# Patient Record
Sex: FEMALE | Race: WHITE | NOT HISPANIC OR LATINO | Employment: OTHER | URBAN - METROPOLITAN AREA
[De-identification: names, ages, dates, MRNs, and addresses within clinical notes are randomized per-mention and may not be internally consistent; named-entity substitution may affect disease eponyms.]

---

## 2017-02-24 ENCOUNTER — OFFICE VISIT (OUTPATIENT)
Dept: CARDIOLOGY | Facility: CLINIC | Age: 72
End: 2017-02-24

## 2017-02-24 VITALS
HEIGHT: 66 IN | BODY MASS INDEX: 19.98 KG/M2 | DIASTOLIC BLOOD PRESSURE: 80 MMHG | SYSTOLIC BLOOD PRESSURE: 150 MMHG | HEART RATE: 73 BPM | WEIGHT: 124.3 LBS

## 2017-02-24 DIAGNOSIS — I73.9 ANGIOPATHY, PERIPHERAL (HCC): ICD-10-CM

## 2017-02-24 DIAGNOSIS — I10 ESSENTIAL HYPERTENSION: ICD-10-CM

## 2017-02-24 DIAGNOSIS — I25.5 CARDIOMYOPATHY, ISCHEMIC: ICD-10-CM

## 2017-02-24 DIAGNOSIS — I25.10 ATHEROSCLEROSIS OF NATIVE CORONARY ARTERY OF NATIVE HEART WITHOUT ANGINA PECTORIS: Primary | ICD-10-CM

## 2017-02-24 DIAGNOSIS — I65.29 ASYMPTOMATIC CAROTID ARTERY NARROWING WITHOUT INFARCTION, UNSPECIFIED LATERALITY: ICD-10-CM

## 2017-02-24 PROCEDURE — 99214 OFFICE O/P EST MOD 30 MIN: CPT | Performed by: INTERNAL MEDICINE

## 2017-02-24 PROCEDURE — 93000 ELECTROCARDIOGRAM COMPLETE: CPT | Performed by: INTERNAL MEDICINE

## 2017-02-24 RX ORDER — BENAZEPRIL HYDROCHLORIDE 5 MG/1
40 TABLET, FILM COATED ORAL DAILY
Qty: 90 TABLET | Refills: 3 | Status: SHIPPED | OUTPATIENT
Start: 2017-02-24 | End: 2017-02-24

## 2017-02-24 RX ORDER — LOVASTATIN 20 MG/1
20 TABLET ORAL
Qty: 90 TABLET | Refills: 3 | Status: SHIPPED | OUTPATIENT
Start: 2017-02-24 | End: 2018-02-23 | Stop reason: ALTCHOICE

## 2017-02-24 RX ORDER — BENAZEPRIL HYDROCHLORIDE 40 MG/1
40 TABLET, FILM COATED ORAL DAILY
Qty: 90 TABLET | Refills: 2 | Status: SHIPPED | OUTPATIENT
Start: 2017-02-24 | End: 2018-02-23 | Stop reason: ALTCHOICE

## 2017-02-24 RX ORDER — CARVEDILOL 3.12 MG/1
3.12 TABLET ORAL 2 TIMES DAILY WITH MEALS
Qty: 180 TABLET | Refills: 3 | Status: SHIPPED | OUTPATIENT
Start: 2017-02-24 | End: 2018-02-26 | Stop reason: SDUPTHER

## 2017-02-24 NOTE — PROGRESS NOTES
Date of Office Visit: 2017  Encounter Provider: Olamide Whaley MD  Place of Service: [facility] CARDIOLOGY  Patient Name: Marlin Pedroza  :1945      Patient ID:  Marlin Pedroza is a 71 y.o. female is here for  followup for CAD.         History of Present Illness    She is known to have significant claudication of the right lower extremity and follows  with Dr. Pinto. She has severe disease involving the right external iliac, right  common iliac, occlusion of the right superficial femoral artery with single vessel runoff  to the right foot posterior tibial artery. She, on the left side, has severe disease, but  patent left superficial femoral artery on the left. She is also known to have carotid  artery disease and had a carotid artery duplex done 2013, showing 15-49% left  internal carotid artery stenosis and 50-60% right internal carotid artery stenosis.  Because of the amount of claudication she has in the right foot she is in need of some  sort of intervention for that leg, so Dr. Pinto is planning for 2013 for  stenting, possibly open procedure, to the right lower extremity.      When I saw her in the office, she had an abnormal electrocardiogram showing inferior  Q-waves, as well as T-wave inversion in the inferior leads. I was very concerned about  coronary artery disease, and she complained of some chest pain and some short-windedness.  Because of her symptoms that very day, on 10/03/2013, we set the patient up for an  echocardiogram which showed an ejection fraction of 42% and mild left ventricular   dilation, inferior wall and septal wall moderate hypokinesis. There was moderate mitral  insufficiency, mild tricuspid insufficiency and grade 1 diastolic dysfunction. She also  had a stress PET study done 10/10/03/2013, which showed a small area of infarct, in the  inferior wall there was a large amount of maryam-infarct ischemia. We took her to  the  cardiac catheterization lab, which was done then, 10/09/2013. This showed no heart  failure. Her ejection fraction was 50% with inferobasilar akinesis and mild inferior wall  hypokinesis. The left anterior descending had sequential proximal 40% stenosis and 50-60%  mid-vessel stenosis. The first diagonal branch was long and patent. The left circumflex  vessel was widely patent and the obtuse marginal branch, which was a moderate vessel, had  20% proximal stenosis. The right coronary artery was 100% occluded proximally and filled  briskly SOFIE-1 flow by ipsilateral and contralateral collaterals. At that time, I  recommended medical changes so that she could go ahead and get intervention of her leg  done.      She has pretty bad Raynaud's disease, and it has worsened since  being on Coreg. In addition, she says the atorvastatin causes her to cough and keeps her  from sleeping.      On 11/25/2013, she did have intervention to her right leg. She had right common and deep  femoral endarterectomy with Vascu-Guard patch. She also had a right common and an  external iliac stent placed.      Marlin Pedroza is here today for followup.  She does continue to smoke; anywhere from a half-pack of cigarettes to one pack of cigarettes per day.  She says she is having difficulty affording her medications and wants to switch to all the $4.00 medications at [facility].  She is not taking anything for her cholesterol and she really needs to be but she says the only she can afford is what is on the $4.00 list so I am going to start her on lovastatin 20 mg daily and switch her losartan to benazepril 20 mg daily.   She has not had any exertional chest tightness or pressure.  She does not feel her heart racing or skipping.  She has no lower extremity edema.  She has no wheezing or coughing.  Her feet are warm and she says she is not having a lot of pain in her legs.  She is not actively exercising.  Overall, she feels like she is doing pretty  well.          Past Medical History   Diagnosis Date   • CAD (coronary artery disease)    • Carotid artery stenosis    • Hypertension    • Intermittent claudication    • Ischemic cardiomyopathy    • PAD (peripheral artery disease)    • Stenosis of iliac artery          Past Surgical History   Procedure Laterality Date   • Hernia repair     • Coronary angioplasty  10/2013   • Hysterectomy     • Cardiac catheterization         Current Outpatient Prescriptions on File Prior to Visit   Medication Sig Dispense Refill   • aspirin 81 MG tablet Take by mouth daily.     • carvedilol (COREG) 3.125 MG tablet Take 1 tablet (3.125 mg total) by mouth 2 (two) times a day with meals. 180 tablet 3   • Cholecalciferol (VITAMIN D3) 2000 UNITS tablet Take by mouth.     • esomeprazole (NexIUM) 40 MG capsule Take by mouth daily.     • losartan (COZAAR) 50 MG tablet Take 1 tablet (50 mg total) by mouth daily. 90 tablet 3   • vitamin B-12 (CYANOCOBALAMIN) 2500 MCG sublingual tablet tablet Place under the tongue daily.     • vitamin B-6 (PYRIDOXINE) 100 MG tablet Take 100 mg by mouth daily.     • [DISCONTINUED] atorvastatin (LIPITOR) 40 MG tablet Take by mouth.       No current facility-administered medications on file prior to visit.        Social History     Social History   • Marital status:      Spouse name: N/A   • Number of children: N/A   • Years of education: N/A     Occupational History   • Not on file.     Social History Main Topics   • Smoking status: Current Every Day Smoker     Packs/day: 0.50   • Smokeless tobacco: Not on file   • Alcohol use No   • Drug use: Not on file      Comment: CAFFEINE USE   • Sexual activity: Not on file     Other Topics Concern   • Not on file     Social History Narrative           Review of Systems   Constitution: Negative.   HENT: Negative for congestion and headaches.    Eyes: Negative for vision loss in left eye and vision loss in right eye.   Respiratory: Negative.  Negative for cough,  "hemoptysis, shortness of breath, sleep disturbances due to breathing, snoring, sputum production and wheezing.    Endocrine: Negative.    Hematologic/Lymphatic: Negative.    Skin: Negative for poor wound healing and rash.   Musculoskeletal: Negative for falls, gout, muscle cramps and myalgias.   Gastrointestinal: Negative for abdominal pain, diarrhea, dysphagia, hematemesis, melena, nausea and vomiting.   Neurological: Negative for excessive daytime sleepiness, dizziness, light-headedness, loss of balance, seizures and vertigo.   Psychiatric/Behavioral: Negative for depression and substance abuse. The patient is not nervous/anxious.        Procedures    ECG 12 Lead  Date/Time: 2/24/2017 11:01 AM  Performed by: VLADISLAV ARROYO  Authorized by: VLADISLAV ARROYO   Comparison: compared with previous ECG   Similar to previous ECG  Rhythm: sinus rhythm  T depression: II, III and aVF  Clinical impression: abnormal ECG               Objective:      Vitals:    02/24/17 1036   BP: 150/80   BP Location: Right arm   Patient Position: Sitting   Pulse: 73   Weight: 124 lb 4.8 oz (56.4 kg)   Height: 66\" (167.6 cm)     Body mass index is 20.06 kg/(m^2).    Physical Exam   Constitutional: She is oriented to person, place, and time. She appears well-developed and well-nourished. No distress.   HENT:   Head: Normocephalic and atraumatic.   Eyes: Conjunctivae are normal. No scleral icterus.   Neck: Neck supple. No JVD present. Carotid bruit is not present. No thyromegaly present.   Cardiovascular: Normal rate, regular rhythm, S1 normal, S2 normal, normal heart sounds and intact distal pulses.   No extrasystoles are present. PMI is not displaced.  Exam reveals no gallop.    No murmur heard.  Pulses:       Carotid pulses are 2+ on the right side, and 2+ on the left side.       Radial pulses are 2+ on the right side, and 2+ on the left side.        Dorsalis pedis pulses are 2+ on the right side, and 2+ on the left side.        " Posterior tibial pulses are 2+ on the right side, and 2+ on the left side.   Pulmonary/Chest: Effort normal and breath sounds normal. No respiratory distress. She has no wheezes. She has no rhonchi. She has no rales. She exhibits no tenderness.   Abdominal: Soft. Bowel sounds are normal. She exhibits no distension, no abdominal bruit and no mass. There is no tenderness.   Musculoskeletal: She exhibits no edema or deformity.   Lymphadenopathy:     She has no cervical adenopathy.   Neurological: She is alert and oriented to person, place, and time. No cranial nerve deficit.   Skin: Skin is warm and dry. No rash noted. She is not diaphoretic. No cyanosis. No pallor. Nails show no clubbing.   Psychiatric: She has a normal mood and affect. Judgment normal.   Vitals reviewed.      Lab Review:       Assessment:      Diagnosis Plan   1. Atherosclerosis of native coronary artery of native heart without angina pectoris     2. Asymptomatic carotid artery narrowing without infarction, unspecified laterality     3. Cardiomyopathy, ischemic     4. Angiopathy, peripheral     5. Essential hypertension       1. PAD, sees Dr. Pinto, s/p right femoral endartectomy and stents in the right iliac.   2. CAD. Occluded RCA with collaterals, no CHF or angina.   3. Tobacco use. Needs to stop smoking, and she knows this and we discuss this at every visit.    4. HTN, on coreg and benazepril.   5. Moderate right internal carotid artery stenosis with mild left internal carotid artery  stenosis.   6. HLD, start lovastatin 20mg daily, currently is not on anything  7. Insomnia, may try melatonin.      Plan:       See back in 1 year, med changes due to affordability.     Coronary Artery Disease  Assessment  • The patient has no angina    Subjective - Objective  • Current antiplatelet therapy includes aspirin 81 mg

## 2018-02-23 ENCOUNTER — OFFICE VISIT (OUTPATIENT)
Dept: CARDIOLOGY | Facility: CLINIC | Age: 73
End: 2018-02-23

## 2018-02-23 VITALS
HEART RATE: 71 BPM | WEIGHT: 136.1 LBS | HEIGHT: 66 IN | DIASTOLIC BLOOD PRESSURE: 82 MMHG | BODY MASS INDEX: 21.87 KG/M2 | SYSTOLIC BLOOD PRESSURE: 160 MMHG

## 2018-02-23 DIAGNOSIS — I25.5 CARDIOMYOPATHY, ISCHEMIC: Primary | ICD-10-CM

## 2018-02-23 DIAGNOSIS — I10 ESSENTIAL HYPERTENSION: ICD-10-CM

## 2018-02-23 DIAGNOSIS — I73.9 ANGIOPATHY, PERIPHERAL (HCC): ICD-10-CM

## 2018-02-23 DIAGNOSIS — I25.10 ATHEROSCLEROSIS OF NATIVE CORONARY ARTERY OF NATIVE HEART WITHOUT ANGINA PECTORIS: ICD-10-CM

## 2018-02-23 PROCEDURE — 99214 OFFICE O/P EST MOD 30 MIN: CPT | Performed by: INTERNAL MEDICINE

## 2018-02-23 PROCEDURE — 93000 ELECTROCARDIOGRAM COMPLETE: CPT | Performed by: INTERNAL MEDICINE

## 2018-02-23 RX ORDER — CARVEDILOL 3.12 MG/1
TABLET ORAL
Qty: 180 TABLET | Refills: 3 | Status: CANCELLED | OUTPATIENT
Start: 2018-02-23

## 2018-02-23 RX ORDER — LOSARTAN POTASSIUM 50 MG/1
50 TABLET ORAL DAILY
Qty: 90 TABLET | Refills: 3 | Status: SHIPPED | OUTPATIENT
Start: 2018-02-23 | End: 2020-02-25 | Stop reason: ALTCHOICE

## 2018-02-23 NOTE — PROGRESS NOTES
Date of Office Visit: 2018  Encounter Provider: Olamide Whaley MD  Place of Service: [facility] CARDIOLOGY  Patient Name: Marlin Pedroza  :1945      Patient ID:  Marlin Pedroza is a 72 y.o. female is here for  followup for CAD        History of Present Illness  She is known to have significant claudication of the right lower extremity and follows  with Dr. Pinto. She has severe disease involving the right external iliac, right  common iliac, occlusion of the right superficial femoral artery with single vessel runoff  to the right foot posterior tibial artery. She, on the left side, has severe disease, but  patent left superficial femoral artery on the left. She is also known to have carotid  artery disease and had a carotid artery duplex done 2013, showing 15-49% left  internal carotid artery stenosis and 50-60% right internal carotid artery stenosis.  Because of the amount of claudication she has in the right foot she is in need of some  sort of intervention for that leg, so Dr. Pinto is planning for 2013 for  stenting, possibly open procedure, to the right lower extremity.       When I saw her in the office, she had an abnormal electrocardiogram showing inferior  Q-waves, as well as T-wave inversion in the inferior leads. I was very concerned about  coronary artery disease, and she complained of some chest pain and some short-windedness.  Because of her symptoms that very day, on 10/03/2013, we set the patient up for an  echocardiogram which showed an ejection fraction of 42% and mild left ventricular   dilation, inferior wall and septal wall moderate hypokinesis. There was moderate mitral  insufficiency, mild tricuspid insufficiency and grade 1 diastolic dysfunction. She also  had a stress PET study done 10/10/03/2013, which showed a small area of infarct, in the  inferior wall there was a large amount of maryam-infarct ischemia. We took her to  the  cardiac catheterization lab, which was done then, 10/09/2013. This showed no heart  failure. Her ejection fraction was 50% with inferobasilar akinesis and mild inferior wall  hypokinesis. The left anterior descending had sequential proximal 40% stenosis and 50-60%  mid-vessel stenosis. The first diagonal branch was long and patent. The left circumflex  vessel was widely patent and the obtuse marginal branch, which was a moderate vessel, had  20% proximal stenosis. The right coronary artery was 100% occluded proximally and filled  briskly SOFIE-1 flow by ipsilateral and contralateral collaterals. At that time, I  recommended medical changes so that she could go ahead and get intervention of her leg  done.       She has pretty bad Raynaud's disease, and it has worsened since  being on Coreg. In addition, she says the atorvastatin causes her to cough and keeps her  from sleeping.       On 11/25/2013, she did have intervention to her right leg. She had right common and deep  femoral endarterectomy with Vascu-Guard patch. She also had a right common and an  external iliac stent placed.       Marlin Pedroza is here today for followup.  She does continue to smoke; anywhere from a half-pack of cigarettes to one pack of cigarettes per day.   she's developed a cough with benazepril so we'll try losartan. I'm not sure if she will be able to afford it.   she has occasional claudication.  She has no chest pain or pressure.  She's had no heart racing or skipping.  She is not short winded.  She will see Dr. Pinto in August.      Past Medical History:   Diagnosis Date   • CAD (coronary artery disease)    • Carotid artery stenosis    • Hypertension    • Intermittent claudication    • Ischemic cardiomyopathy    • PAD (peripheral artery disease)    • Stenosis of iliac artery          Past Surgical History:   Procedure Laterality Date   • CARDIAC CATHETERIZATION     • CORONARY ANGIOPLASTY  10/2013   • HERNIA REPAIR     • HYSTERECTOMY          Current Outpatient Prescriptions on File Prior to Visit   Medication Sig Dispense Refill   • aspirin 81 MG tablet Take by mouth daily.     • carvedilol (COREG) 3.125 MG tablet Take 1 tablet by mouth 2 (Two) Times a Day With Meals. 180 tablet 3   • Cholecalciferol (VITAMIN D3) 2000 UNITS tablet Take by mouth.     • esomeprazole (NexIUM) 40 MG capsule Take by mouth daily.     • vitamin B-12 (CYANOCOBALAMIN) 2500 MCG sublingual tablet tablet Place under the tongue daily.     • vitamin B-6 (PYRIDOXINE) 100 MG tablet Take 100 mg by mouth daily.     • [DISCONTINUED] benazepril (LOTENSIN) 40 MG tablet Take 1 tablet by mouth Daily. 90 tablet 2   • [DISCONTINUED] lovastatin (MEVACOR) 20 MG tablet Take 1 tablet by mouth Daily With Dinner. 90 tablet 3     No current facility-administered medications on file prior to visit.        Social History     Social History   • Marital status:      Spouse name: N/A   • Number of children: N/A   • Years of education: N/A     Occupational History   • Not on file.     Social History Main Topics   • Smoking status: Current Every Day Smoker     Packs/day: 0.50   • Smokeless tobacco: Never Used   • Alcohol use No   • Drug use: Not on file      Comment: CAFFEINE USE   • Sexual activity: Not on file     Other Topics Concern   • Not on file     Social History Narrative           Review of Systems   Constitution: Negative.   HENT: Negative for congestion.    Eyes: Negative for vision loss in left eye and vision loss in right eye.   Respiratory: Negative.  Negative for cough, hemoptysis, shortness of breath, sleep disturbances due to breathing, snoring, sputum production and wheezing.    Endocrine: Negative.    Hematologic/Lymphatic: Negative.    Skin: Negative for poor wound healing and rash.   Musculoskeletal: Negative for falls, gout, muscle cramps and myalgias.   Gastrointestinal: Negative for abdominal pain, diarrhea, dysphagia, hematemesis, melena, nausea and vomiting.  "  Neurological: Negative for excessive daytime sleepiness, dizziness, headaches, light-headedness, loss of balance, seizures and vertigo.   Psychiatric/Behavioral: Negative for depression and substance abuse. The patient is not nervous/anxious.        Procedures    ECG 12 Lead  Date/Time: 2/23/2018 9:52 AM  Performed by: VLADISLAV ARROYO  Authorized by: VLADISLAV ARROYO   Comparison: compared with previous ECG   Similar to previous ECG  Rhythm: sinus rhythm  Other findings: PRWP  Q waves: II, III and aVF  Clinical impression: abnormal ECG               Objective:      Vitals:    02/23/18 0945   BP: 160/82   BP Location: Right arm   Patient Position: Sitting   Pulse: 71   Weight: 61.7 kg (136 lb 1.6 oz)   Height: 167.6 cm (66\")     Body mass index is 21.97 kg/(m^2).    Physical Exam   Constitutional: She is oriented to person, place, and time. She appears well-developed and well-nourished. No distress.   HENT:   Head: Normocephalic and atraumatic.   Eyes: Conjunctivae are normal. No scleral icterus.   Neck: Neck supple. No JVD present. Carotid bruit is not present. No thyromegaly present.   Cardiovascular: Normal rate, regular rhythm, S1 normal, S2 normal, normal heart sounds and intact distal pulses.   No extrasystoles are present. PMI is not displaced.  Exam reveals no gallop.    No murmur heard.  Pulses:       Carotid pulses are 2+ on the right side, and 2+ on the left side.       Radial pulses are 2+ on the right side, and 2+ on the left side.        Dorsalis pedis pulses are 2+ on the right side, and 2+ on the left side.        Posterior tibial pulses are 2+ on the right side, and 2+ on the left side.   Pulmonary/Chest: Effort normal and breath sounds normal. No respiratory distress. She has no wheezes. She has no rhonchi. She has no rales. She exhibits no tenderness.   Abdominal: Soft. Bowel sounds are normal. She exhibits no distension, no abdominal bruit and no mass. There is no tenderness. "   Musculoskeletal: She exhibits no edema or deformity.   Lymphadenopathy:     She has no cervical adenopathy.   Neurological: She is alert and oriented to person, place, and time. No cranial nerve deficit.   Skin: Skin is warm and dry. No rash noted. She is not diaphoretic. No cyanosis. No pallor. Nails show no clubbing.   Psychiatric: She has a normal mood and affect. Judgment normal.   Vitals reviewed.      Lab Review:       Assessment:      Diagnosis Plan   1. Cardiomyopathy, ischemic     2. Essential hypertension     3. Atherosclerosis of native coronary artery of native heart without angina pectoris     4. Angiopathy, peripheral       1. PAD, sees Dr. Pinto, s/p right femoral endartectomy and stents in the right iliac.    2. CAD. Occluded RCA with collaterals, no CHF or angina.   3. Tobacco use. Needs to stop smoking, and she knows this and we discuss this at every visit.    4. HTN, on coreg, off benazepril due to cough, try losartan 50mg daily.   5. Moderate right internal carotid artery stenosis with mild left internal carotid artery  stenosis.   6. HLD, off lovastatin 20mg due to headaches and off atorvastatin due to cough,  currently is not on anything  7. Insomnia, may try melatonin.      Plan:       Will call in one month to let me know how she's feeling with the losartan.  If she is tolerating it, consider simvastatin 20 mg daily for her lipids.  I didn't want to start to new medications at once.  see back in 1 year.     Coronary Artery Disease  Assessment  • The patient has no angina    Subjective - Objective  • Current antiplatelet therapy includes aspirin 81 mg

## 2018-02-26 RX ORDER — CARVEDILOL 3.12 MG/1
3.12 TABLET ORAL 2 TIMES DAILY WITH MEALS
Qty: 180 TABLET | Refills: 3 | Status: SHIPPED | OUTPATIENT
Start: 2018-02-26 | End: 2019-04-30 | Stop reason: SDUPTHER

## 2018-08-22 ENCOUNTER — OFFICE (OUTPATIENT)
Dept: URBAN - NONMETROPOLITAN AREA CLINIC 13 | Facility: CLINIC | Age: 73
End: 2018-08-22
Payer: MEDICARE

## 2018-08-22 ENCOUNTER — OFFICE (OUTPATIENT)
Dept: URBAN - NONMETROPOLITAN AREA CLINIC 13 | Facility: CLINIC | Age: 73
End: 2018-08-22
Payer: COMMERCIAL

## 2018-08-22 VITALS
HEART RATE: 66 BPM | HEIGHT: 68 IN | DIASTOLIC BLOOD PRESSURE: 87 MMHG | OXYGEN SATURATION: 98 % | WEIGHT: 202 LBS | DIASTOLIC BLOOD PRESSURE: 76 MMHG | SYSTOLIC BLOOD PRESSURE: 152 MMHG | SYSTOLIC BLOOD PRESSURE: 134 MMHG

## 2018-08-22 VITALS — HEIGHT: 68 IN

## 2018-08-22 DIAGNOSIS — K21.9 GASTRO-ESOPHAGEAL REFLUX DISEASE WITHOUT ESOPHAGITIS: ICD-10-CM

## 2018-08-22 DIAGNOSIS — R13.10 DYSPHAGIA, UNSPECIFIED: ICD-10-CM

## 2018-08-22 DIAGNOSIS — Z01.812 ENCOUNTER FOR PREPROCEDURAL LABORATORY EXAMINATION: ICD-10-CM

## 2018-08-22 LAB
CBC SYSMEX: HEMATOCRIT: 50.9 % — CRITICAL HIGH (ref 37–47)
CBC SYSMEX: HEMOGLOBIN: 17.5 G/DL — HIGH (ref 12–14)
CBC SYSMEX: LYMPHS %: 19.3 % — LOW (ref 20.5–40.5)
CBC SYSMEX: LYMPHS ABSOLUTE: 2 10*3U/L (ref 1.3–2.9)
CBC SYSMEX: MCH: 32.8 PG — HIGH (ref 27–32)
CBC SYSMEX: MCHC: 34.4 G/DL (ref 28.5–35)
CBC SYSMEX: MCV: 95.3 FL (ref 84–100)
CBC SYSMEX: MONOS %: 10.4 % — HIGH (ref 2–10)
CBC SYSMEX: MONOS ABSOLUTE: 1.1 10*3U/L (ref 0.3–3.8)
CBC SYSMEX: MPV: 11 FL (ref 8.3–11.9)
CBC SYSMEX: NEUT. %: 70.3 % — HIGH (ref 43–67)
CBC SYSMEX: NEUT. ABSOLUTE: 7.2 10*3U/L — HIGH (ref 2.2–4.8)
CBC SYSMEX: PLATELETS: 224 10*3U/L (ref 130–440)
CBC SYSMEX: RBC: 5.3 10*6U/L (ref 4.2–5.4)
CBC SYSMEX: RDW: 12.7 % (ref 11.5–15.5)
CBC SYSMEX: WBC: 10.3 10*3U/L (ref 4.5–10.5)
COMPLETE METABOLIC: ALBUMIN: 4.6 G/DL (ref 3.5–5.2)
COMPLETE METABOLIC: ALK. PHOS: 82 U/L (ref 40–150)
COMPLETE METABOLIC: ALT: 29 U/L (ref 0–55)
COMPLETE METABOLIC: AST: 25 U/L (ref 5–34)
COMPLETE METABOLIC: BUN: 13 MG/DL (ref 7–26)
COMPLETE METABOLIC: CALCIUM: 9.9 MG/DL (ref 8.4–10.3)
COMPLETE METABOLIC: CHLORIDE: 106 MMOL/L (ref 98–107)
COMPLETE METABOLIC: CO2: 25 MEQ/L (ref 22–29)
COMPLETE METABOLIC: CREATININE: 0.91 MG/DL (ref 0.57–1.25)
COMPLETE METABOLIC: GFR - AFRICAN AMERICAN: 78.5 (ref 59–200)
COMPLETE METABOLIC: GFR - NON AFRICAN AMERICAN: 64.8 (ref 59–200)
COMPLETE METABOLIC: GLUCOSE: 185 MG/DL — HIGH (ref 70–99)
COMPLETE METABOLIC: POTASSIUM: 3.9 MMOL/L (ref 3.5–5.3)
COMPLETE METABOLIC: SODIUM: 141 MMOL/L (ref 136–145)
COMPLETE METABOLIC: TOTAL BILIRUBIN: 0.6 MG/DL (ref 0.2–1.2)
COMPLETE METABOLIC: TOTAL PROTEIN: 7.1 G/DL (ref 6.4–8.3)

## 2018-08-22 PROCEDURE — 80053 COMPREHEN METABOLIC PANEL: CPT | Performed by: NURSE PRACTITIONER

## 2018-08-22 PROCEDURE — 36415 COLL VENOUS BLD VENIPUNCTURE: CPT | Performed by: NURSE PRACTITIONER

## 2018-08-22 PROCEDURE — 99203 OFFICE O/P NEW LOW 30 MIN: CPT | Performed by: NURSE PRACTITIONER

## 2018-08-22 PROCEDURE — 85025 COMPLETE CBC W/AUTO DIFF WBC: CPT | Performed by: NURSE PRACTITIONER

## 2018-09-10 ENCOUNTER — OFFICE (OUTPATIENT)
Dept: URBAN - NONMETROPOLITAN AREA CLINIC 13 | Facility: CLINIC | Age: 73
End: 2018-09-10
Payer: COMMERCIAL

## 2018-09-10 ENCOUNTER — AMBULATORY SURGICAL CENTER (OUTPATIENT)
Dept: URBAN - NONMETROPOLITAN AREA SURGERY 2 | Facility: SURGERY | Age: 73
End: 2018-09-10
Payer: COMMERCIAL

## 2018-09-10 VITALS
DIASTOLIC BLOOD PRESSURE: 62 MMHG | SYSTOLIC BLOOD PRESSURE: 128 MMHG | OXYGEN SATURATION: 100 % | DIASTOLIC BLOOD PRESSURE: 54 MMHG | HEART RATE: 63 BPM | RESPIRATION RATE: 16 BRPM | SYSTOLIC BLOOD PRESSURE: 118 MMHG | HEART RATE: 62 BPM | DIASTOLIC BLOOD PRESSURE: 72 MMHG | RESPIRATION RATE: 20 BRPM | RESPIRATION RATE: 18 BRPM | SYSTOLIC BLOOD PRESSURE: 124 MMHG | OXYGEN SATURATION: 98 % | HEART RATE: 61 BPM | DIASTOLIC BLOOD PRESSURE: 82 MMHG | OXYGEN SATURATION: 97 % | HEART RATE: 58 BPM | WEIGHT: 202 LBS | SYSTOLIC BLOOD PRESSURE: 108 MMHG | OXYGEN SATURATION: 94 % | DIASTOLIC BLOOD PRESSURE: 69 MMHG | HEART RATE: 50 BPM | HEIGHT: 68 IN | HEART RATE: 60 BPM | DIASTOLIC BLOOD PRESSURE: 71 MMHG | SYSTOLIC BLOOD PRESSURE: 104 MMHG

## 2018-09-10 DIAGNOSIS — K21.9 GASTRO-ESOPHAGEAL REFLUX DISEASE WITHOUT ESOPHAGITIS: ICD-10-CM

## 2018-09-10 DIAGNOSIS — R13.10 DYSPHAGIA, UNSPECIFIED: ICD-10-CM

## 2018-09-10 DIAGNOSIS — K29.40 CHRONIC ATROPHIC GASTRITIS WITHOUT BLEEDING: ICD-10-CM

## 2018-09-10 DIAGNOSIS — K22.2 ESOPHAGEAL OBSTRUCTION: ICD-10-CM

## 2018-09-10 DIAGNOSIS — K31.89 OTHER DISEASES OF STOMACH AND DUODENUM: ICD-10-CM

## 2018-09-10 DIAGNOSIS — E83.119 HEMOCHROMATOSIS, UNSPECIFIED: ICD-10-CM

## 2018-09-10 DIAGNOSIS — K29.70 GASTRITIS, UNSPECIFIED, WITHOUT BLEEDING: ICD-10-CM

## 2018-09-10 PROCEDURE — 00731 ANES UPR GI NDSC PX NOS: CPT | Mod: QS,QZ

## 2018-09-10 PROCEDURE — 43239 EGD BIOPSY SINGLE/MULTIPLE: CPT | Mod: 59 | Performed by: INTERNAL MEDICINE

## 2018-09-10 PROCEDURE — 88305 TISSUE EXAM BY PATHOLOGIST: CPT | Mod: TC | Performed by: INTERNAL MEDICINE

## 2018-09-10 PROCEDURE — 43248 EGD GUIDE WIRE INSERTION: CPT | Performed by: INTERNAL MEDICINE

## 2018-09-10 RX ORDER — PANTOPRAZOLE SODIUM 40 MG/1
TABLET, DELAYED RELEASE ORAL
Qty: 90 | Refills: 1 | Status: ACTIVE
Start: 2018-09-10

## 2018-09-13 LAB — SURGICAL PROCEDURE: PDF REPORT: (no result)

## 2019-02-25 ENCOUNTER — OFFICE VISIT (OUTPATIENT)
Dept: CARDIOLOGY | Facility: CLINIC | Age: 74
End: 2019-02-25

## 2019-02-25 VITALS
HEART RATE: 59 BPM | HEIGHT: 66 IN | WEIGHT: 137.5 LBS | SYSTOLIC BLOOD PRESSURE: 140 MMHG | BODY MASS INDEX: 22.1 KG/M2 | DIASTOLIC BLOOD PRESSURE: 90 MMHG

## 2019-02-25 DIAGNOSIS — I25.5 CARDIOMYOPATHY, ISCHEMIC: ICD-10-CM

## 2019-02-25 DIAGNOSIS — I25.10 ATHEROSCLEROSIS OF NATIVE CORONARY ARTERY OF NATIVE HEART WITHOUT ANGINA PECTORIS: Primary | ICD-10-CM

## 2019-02-25 DIAGNOSIS — I10 ESSENTIAL HYPERTENSION: ICD-10-CM

## 2019-02-25 PROCEDURE — 93000 ELECTROCARDIOGRAM COMPLETE: CPT | Performed by: INTERNAL MEDICINE

## 2019-02-25 PROCEDURE — 99214 OFFICE O/P EST MOD 30 MIN: CPT | Performed by: INTERNAL MEDICINE

## 2019-02-25 NOTE — PROGRESS NOTES
Date of Office Visit: 2019  Encounter Provider: Olamide Whaley MD  Place of Service: [facility] CARDIOLOGY  Patient Name: Marlin Pedroza  :1945      Patient ID:  Marlin Pedroza is a 73 y.o. female is here for  followup for CAD, ischemic CMP.         History of Present Illness    She is known to have significant claudication of the right lower extremity and follows  with Dr. Pinto. She has severe disease involving the right external iliac, right  common iliac, occlusion of the right superficial femoral artery with single vessel runoff  to the right foot posterior tibial artery. She, on the left side, has severe disease, but  patent left superficial femoral artery on the left. She is also known to have carotid  artery disease and had a carotid artery duplex done 2013, showing 15-49% left  internal carotid artery stenosis and 50-60% right internal carotid artery stenosis.  Because of the amount of claudication she has in the right foot she is in need of some  sort of intervention for that leg, so Dr. Pinto is planning for 2013 for  stenting, possibly open procedure, to the right lower extremity.       When I saw her in the office, she had an abnormal electrocardiogram showing inferior  Q-waves, as well as T-wave inversion in the inferior leads. I was very concerned about  coronary artery disease, and she complained of some chest pain and some short-windedness.  Because of her symptoms that very day, on 10/03/2013, we set the patient up for an  echocardiogram which showed an ejection fraction of 42% and mild left ventricular   dilation, inferior wall and septal wall moderate hypokinesis. There was moderate mitral  insufficiency, mild tricuspid insufficiency and grade 1 diastolic dysfunction. She also  had a stress PET study done 10/10/03/2013, which showed a small area of infarct, in the  inferior wall there was a large amount of maryam-infarct ischemia. We took  her to the  cardiac catheterization lab, which was done then, 10/09/2013. This showed no heart  failure. Her ejection fraction was 50% with inferobasilar akinesis and mild inferior wall  hypokinesis. The left anterior descending had sequential proximal 40% stenosis and 50-60%  mid-vessel stenosis. The first diagonal branch was long and patent. The left circumflex  vessel was widely patent and the obtuse marginal branch, which was a moderate vessel, had  20% proximal stenosis. The right coronary artery was 100% occluded proximally and filled  briskly SOFIE-1 flow by ipsilateral and contralateral collaterals. At that time, I  recommended medical changes so that she could go ahead and get intervention of her leg  done.       She has pretty bad Raynaud's disease, and it has worsened since  being on Coreg. In addition, she says the atorvastatin causes her to cough and keeps her  from sleeping.       On 11/25/2013, she did have intervention to her right leg. She had right common and deep  femoral endarterectomy with Vascu-Guard patch. She also had a right common and an  external iliac stent placed.     She has no chest pain, dyspnea, dizziness.  She continues to smoke.  She was in a financial scam with someone who claimed to be from social security and lost $27,000.  Because of this, she now has no money is having difficulty even affording routine generic medications.  She is not on losartan because of cost.  She is now on a statin because of muscle aching and cost.  Her only blood pressure medication currently is carvedilol.  Despite this, she overall feels fairly well.    She saw vascular 8/2019 and had carotid duplex showing 60-70% YONATHAN stenosis and 50-60% LICA stenosis.  Vertebral arteries had antegrade flow.  Her LAYNE on the right was 0.69 and on the left 0.95.    Past Medical History:   Diagnosis Date   • CAD (coronary artery disease)    • Carotid artery stenosis    • Hypertension    • Intermittent claudication  (CMS/[facility])    • Ischemic cardiomyopathy    • PAD (peripheral artery disease) (CMS/[facility])    • Stenosis of iliac artery (CMS/[facility])          Past Surgical History:   Procedure Laterality Date   • CARDIAC CATHETERIZATION     • CORONARY ANGIOPLASTY  10/2013   • HERNIA REPAIR     • HYSTERECTOMY         Current Outpatient Medications on File Prior to Visit   Medication Sig Dispense Refill   • aspirin 81 MG tablet Take by mouth daily.     • carvedilol (COREG) 3.125 MG tablet Take 1 tablet by mouth 2 (Two) Times a Day With Meals. (Patient taking differently: Take 6.25 mg by mouth 2 (Two) Times a Day With Meals.) 180 tablet 3   • Cholecalciferol (VITAMIN D3) 2000 UNITS tablet Take by mouth.     • esomeprazole (NexIUM) 40 MG capsule Take by mouth daily.     • vitamin B-12 (CYANOCOBALAMIN) 2500 MCG sublingual tablet tablet Place under the tongue daily.     • vitamin B-6 (PYRIDOXINE) 100 MG tablet Take 100 mg by mouth daily.     • losartan (COZAAR) 50 MG tablet Take 1 tablet by mouth Daily. 90 tablet 3     No current facility-administered medications on file prior to visit.        Social History     Socioeconomic History   • Marital status:      Spouse name: Not on file   • Number of children: Not on file   • Years of education: Not on file   • Highest education level: Not on file   Social Needs   • Financial resource strain: Not on file   • Food insecurity - worry: Not on file   • Food insecurity - inability: Not on file   • Transportation needs - medical: Not on file   • Transportation needs - non-medical: Not on file   Occupational History   • Not on file   Tobacco Use   • Smoking status: Current Every Day Smoker     Packs/day: 0.50   • Smokeless tobacco: Never Used   Substance and Sexual Activity   • Alcohol use: No   • Drug use: Not on file     Comment: CAFFEINE USE   • Sexual activity: Not on file   Other Topics Concern   • Not on file   Social History Narrative   • Not on file           Review of Systems  "  Constitution: Negative.   HENT: Negative for congestion.    Eyes: Negative for vision loss in left eye and vision loss in right eye.   Respiratory: Negative.  Negative for cough, hemoptysis, shortness of breath, sleep disturbances due to breathing, snoring, sputum production and wheezing.    Endocrine: Negative.    Hematologic/Lymphatic: Negative.    Skin: Negative for poor wound healing and rash.   Musculoskeletal: Negative for falls, gout, muscle cramps and myalgias.   Gastrointestinal: Negative for abdominal pain, diarrhea, dysphagia, hematemesis, melena, nausea and vomiting.   Neurological: Negative for excessive daytime sleepiness, dizziness, headaches, light-headedness, loss of balance, seizures and vertigo.   Psychiatric/Behavioral: Negative for depression and substance abuse. The patient is not nervous/anxious.        Procedures    ECG 12 Lead  Date/Time: 2/25/2019 10:29 AM  Performed by: Olamide Whaley MD  Authorized by: Olamide Whaley MD   Comparison: compared with previous ECG   Similar to previous ECG  Rhythm: sinus rhythm  T inversion: III and aVF    Clinical impression: abnormal EKG                Objective:      Vitals:    02/25/19 1014   BP: 140/90   BP Location: Right arm   Patient Position: Sitting   Pulse: 59   Weight: 62.4 kg (137 lb 8 oz)   Height: 167.6 cm (66\")     Body mass index is 22.19 kg/m².    Physical Exam   Constitutional: She is oriented to person, place, and time. She appears well-developed and well-nourished. No distress.   HENT:   Head: Normocephalic and atraumatic.   Eyes: Conjunctivae are normal. No scleral icterus.   Neck: Neck supple. No JVD present. Carotid bruit is not present. No thyromegaly present.   Cardiovascular: Normal rate, regular rhythm, S1 normal, S2 normal, normal heart sounds and intact distal pulses.  No extrasystoles are present. PMI is not displaced. Exam reveals no gallop.   No murmur heard.  Pulses:       Carotid pulses are 2+ on the right " side, and 2+ on the left side.       Radial pulses are 2+ on the right side, and 2+ on the left side.        Dorsalis pedis pulses are 2+ on the right side, and 2+ on the left side.        Posterior tibial pulses are 2+ on the right side, and 2+ on the left side.   Pulmonary/Chest: Effort normal and breath sounds normal. No respiratory distress. She has no wheezes. She has no rhonchi. She has no rales. She exhibits no tenderness.   Abdominal: Soft. Bowel sounds are normal. She exhibits no distension, no abdominal bruit and no mass. There is no tenderness.   Musculoskeletal: She exhibits no edema or deformity.   Lymphadenopathy:     She has no cervical adenopathy.   Neurological: She is alert and oriented to person, place, and time. No cranial nerve deficit.   Skin: Skin is warm and dry. No rash noted. She is not diaphoretic. No cyanosis. No pallor. Nails show no clubbing.   Psychiatric: She has a normal mood and affect. Judgment normal.   Vitals reviewed.      Lab Review:       Assessment:      Diagnosis Plan   1. Atherosclerosis of native coronary artery of native heart without angina pectoris     2. Essential hypertension     3. Cardiomyopathy, ischemic          1. PAD, sees Dr. Pinto, s/p right femoral endartectomy and stents in the right iliac.    2. CAD. Occluded RCA with collaterals, no CHF or angina.   3. Tobacco use. Needs to stop smoking, and she knows this and we discuss this at every visit.    4. HTN, on coreg, off benazepril due to cough, not on losartan due to cost.   5. Moderate right internal carotid artery stenosis with mild left internal carotid artery  stenosis.   6. HLD, not taking any statins.   7. Insomnia, may try melatonin.      Plan:       F/u in 1 year with marycarmen. She is having a lot of difficulty affording meds.     Coronary Artery Disease  Subjective - Objective  • Current antiplatelet therapy includes aspirin 81 mg

## 2019-04-30 RX ORDER — CARVEDILOL 3.12 MG/1
TABLET ORAL
Qty: 180 TABLET | Refills: 3 | Status: SHIPPED | OUTPATIENT
Start: 2019-04-30 | End: 2020-02-25 | Stop reason: SDUPTHER

## 2019-04-30 NOTE — TELEPHONE ENCOUNTER
Pt called and wanted to know if you would send in a refill her Rx. Her carvedilol 3.125 MG. [facility] in [place], [place]

## 2020-02-21 NOTE — PROGRESS NOTES
Date of Office Visit: 2020  Encounter Provider: GUILLERMINA Miller  Place of Service: [facility] CARDIOLOGY  Patient Name: Marlin Pedroza  :1945  Primary Cardiologist: Dr. Whaley    CC:  Annual cardiac evaluation    Dear Dr. KABA: Marlin Pedroza is a pleasant 74 y.o. female who presents 2020 for cardiac follow up.  She is a new patient to me and I reviewed her past medical records.  She is a patient of Dr. Whaley.    She is known to have significant claudication of the right lower extremity and follows with Dr. Pinto. She has severe disease involving the right external iliac, right common iliac, occlusion of the right superficial femoral artery with single vessel runoff to the right foot posterior tibial artery. She, on the left side, has severe disease, but patent left superficial femoral artery on the left. She is also known to have carotid artery disease and had a carotid artery duplex done 2013, showing 15-49% left internal carotid artery stenosis and 50-60% right internal carotid artery stenosis. Because of the amount of claudication she has in the right foot she is in need of some sort of intervention for that leg, so Dr. Pinto is planning for 2013 for stenting, possibly open procedure, to the right lower extremity.       When Dr. Whaley saw her  in the office, she had an abnormal electrocardiogram showing inferior Q-waves, as well as T-wave inversion in the inferior leads. She was very concerned about coronary artery disease, and she complained of some chest pain and some short-windedness. Because of her symptoms that very day, on 10/03/2013, we set the patient up for an echocardiogram which showed an ejection fraction of 42% and mild left ventricular  dilation, inferior wall and septal wall moderate hypokinesis. There was moderate mitral insufficiency, mild tricuspid insufficiency and grade 1 diastolic dysfunction. She also had a stress  PET study done 10/10/03/2013, which showed a small area of infarct, in the inferior wall there was a large amount of maryam-infarct ischemia. We took her to the cardiac catheterization lab, which was done then, 10/09/2013. This showed no heart failure. Her ejection fraction was 50% with inferobasilar akinesis and mild inferior wall hypokinesis. The left anterior descending had sequential proximal 40% stenosis and 50-60% mid-vessel stenosis. The first diagonal branch was long and patent. The left circumflex vessel was widely patent and the obtuse marginal branch, which was a moderate vessel, had 20% proximal stenosis. The right coronary artery was 100% occluded proximally and filled briskly SOFIE-1 flow by ipsilateral and contralateral collaterals. At that time, Dr. Whaley recommended medical changes so that she could go ahead and get intervention of her leg done.       She has pretty bad Raynaud's disease, and it has worsened since being on Coreg. In addition, she says the atorvastatin causes her to cough and keeps her from sleeping.       On 11/25/2013, she did have intervention to her right leg. She had right common and deep femoral endarterectomy with Vascu-Guard patch. She also had a right common and an external iliac stent placed.      .  She continues to smoke.  She was in a financial scam with someone who claimed to be from social security and lost $27,000.  Because of this, she now has no money is having difficulty even affording routine generic medications.  She is not on losartan because of cough.  She is not on a statin because of muscle aching and cost.  Her only blood pressure medication currently is carvedilol.  Despite this, she overall feels fairly well.     She saw vascular 8/2019 and had carotid duplex showing 60-70% YONATHAN stenosis and 50-60% LICA stenosis.  Vertebral arteries had antegrade flow.  Her LAYNE on the right was 0.69 and on the left 0.95.     She states overall she feels good.  She says  her shortness of breath is unchanged.  She denies any palpitations or lower extremity edema.  She is had no episode of chest pain or chest tightness.  She will have some dizziness with quick positional changes she denies any fatigue.  She continues to smoke half pack a day.  She denies any leg pain, numbness or tingling upper lower extremities.  She denies any snoring, PND, cough, orthopnea.  She denies any bleeding.  Her blood pressure is very high today.  She does not seem to take this seriously.  Past Medical History:   Diagnosis Date   • CAD (coronary artery disease)    • Carotid artery stenosis    • Hypertension    • Intermittent claudication (CMS/[facility])    • Ischemic cardiomyopathy    • PAD (peripheral artery disease) (CMS/[facility])    • Stenosis of iliac artery (CMS/[facility])        Past Surgical History:   Procedure Laterality Date   • CARDIAC CATHETERIZATION     • CORONARY ANGIOPLASTY  10/2013   • HERNIA REPAIR     • HYSTERECTOMY         Social History     Socioeconomic History   • Marital status:      Spouse name: Not on file   • Number of children: Not on file   • Years of education: Not on file   • Highest education level: Not on file   Tobacco Use   • Smoking status: Current Every Day Smoker     Packs/day: 0.50   • Smokeless tobacco: Never Used   Substance and Sexual Activity   • Alcohol use: No   • Drug use: Never     Comment: CAFFEINE USE       Family History   Problem Relation Age of Onset   • Anuerysm Father        The following portion of the patient's history were reviewed and updated as appropriate: past medical history, past surgical history, past social history, past family history, allergies, current medications, and problem list.    Review of Systems   Constitution: Negative for diaphoresis, fever and malaise/fatigue.   HENT: Negative for congestion, hearing loss, hoarse voice, nosebleeds and sore throat.    Eyes: Negative for photophobia, vision loss in left eye, vision loss in right eye and  visual disturbance.   Cardiovascular: Negative for chest pain, dyspnea on exertion, irregular heartbeat, leg swelling, near-syncope, orthopnea, palpitations, paroxysmal nocturnal dyspnea and syncope.   Respiratory: Positive for shortness of breath. Negative for cough, hemoptysis, sleep disturbances due to breathing, snoring, sputum production and wheezing.    Endocrine: Negative for cold intolerance, heat intolerance, polydipsia, polyphagia and polyuria.   Hematologic/Lymphatic: Negative for bleeding problem. Does not bruise/bleed easily.   Skin: Negative for color change, dry skin, poor wound healing, rash and suspicious lesions.   Musculoskeletal: Negative for arthritis, back pain, falls, gout, joint pain, joint swelling, muscle cramps, muscle weakness and myalgias.   Gastrointestinal: Negative for bloating, abdominal pain, constipation, diarrhea, dysphagia, melena, nausea and vomiting.   Neurological: Positive for dizziness. Negative for excessive daytime sleepiness, headaches, light-headedness, loss of balance, numbness, paresthesias, seizures, vertigo and weakness.   Psychiatric/Behavioral: Negative for depression, memory loss and substance abuse. The patient is not nervous/anxious.        Allergies   Allergen Reactions   • Morphine And Related    • Penicillins Rash         Current Outpatient Medications:   •  aspirin 81 MG tablet, Take by mouth daily., Disp: , Rfl:   •  carvedilol (COREG) 3.125 MG tablet, Take 1 tablet by mouth 2 (Two) Times a Day With Meals., Disp: 180 tablet, Rfl: 3  •  Cholecalciferol (VITAMIN D3) 2000 UNITS tablet, Take by mouth., Disp: , Rfl:   •  esomeprazole (NexIUM) 40 MG capsule, Take by mouth daily., Disp: , Rfl:   •  vitamin B-12 (CYANOCOBALAMIN) 2500 MCG sublingual tablet tablet, Place under the tongue daily., Disp: , Rfl:   •  vitamin B-6 (PYRIDOXINE) 100 MG tablet, Take 100 mg by mouth daily., Disp: , Rfl:   •  hydrALAZINE (APRESOLINE) 25 MG tablet, Take 1 tablet by mouth 2 (Two)  "Times a Day., Disp: 180 tablet, Rfl: 3        Objective:     Vitals:    02/25/20 1008   BP: 170/100   BP Location: Left arm   Patient Position: Sitting   Cuff Size: Adult   Pulse: 65   Resp: 16   SpO2: 96%   Weight: 58.5 kg (129 lb)   Height: 167.6 cm (66\")     Body mass index is 20.82 kg/m².      Physical Exam   Constitutional: She is oriented to person, place, and time. She appears well-developed and well-nourished. No distress.   HENT:   Head: Normocephalic and atraumatic.   Right Ear: External ear normal.   Left Ear: External ear normal.   Nose: Nose normal.   Eyes: Pupils are equal, round, and reactive to light. Conjunctivae are normal. Right eye exhibits no discharge. Left eye exhibits no discharge.   Neck: Normal range of motion. Neck supple. No JVD present. No tracheal deviation present. No thyromegaly present.   Cardiovascular: Normal rate, regular rhythm, normal heart sounds and intact distal pulses. Exam reveals no gallop and no friction rub.   No murmur heard.  Pulmonary/Chest: Effort normal and breath sounds normal. No respiratory distress. She has no wheezes. She has no rales. She exhibits no tenderness.   Abdominal: Soft. Bowel sounds are normal. She exhibits no distension. There is no tenderness.   Musculoskeletal: Normal range of motion. She exhibits no edema, tenderness or deformity.   Lymphadenopathy:     She has no cervical adenopathy.   Neurological: She is alert and oriented to person, place, and time. Coordination normal.   Skin: Skin is warm and dry. No rash noted. No erythema.   Psychiatric: She has a normal mood and affect. Her behavior is normal. Judgment and thought content normal.             ECG 12 Lead  Date/Time: 2/25/2020 10:39 AM  Performed by: Maia Carlisle APRN  Authorized by: Maia Carlisle APRN   Comparison: compared with previous ECG from 2/25/2019  Similar to previous ECG  Rhythm: sinus rhythm  Rate: normal  Conduction: conduction normal  ST Segments: ST segments normal  T " inversion: III, aVF and V1  QRS axis: normal    Clinical impression: abnormal EKG  Comments: Discussed with JK              Assessment:       Diagnosis Plan   1. Atherosclerosis of native coronary artery of native heart without angina pectoris  Comprehensive Metabolic Panel    CBC Auto Differential   2. Essential hypertension  Comprehensive Metabolic Panel    CBC Auto Differential   3. Cardiomyopathy, ischemic  Comprehensive Metabolic Panel    CBC Auto Differential   4. Angiopathy, peripheral (CMS/HCC)            Plan:       1.  PAD-sees Dr. Pinto, status post right femoral endarterectomy and stents in the left iliac    2.  CAD-occluded RCA with collaterals.  Denies angina    3.  Hypertension-on Coreg.  Off benazepril due to cough and not on losartan due to cough.  Will try hydralazine 25 mg BID.  This is on her $4.00 list.  She will take her BP once a day, varying the times, and call in in 1-2 weeks. Check BMP and CBC.    4.  Moderate right internal carotid artery stenosis with mild left internal carotid artery stenosis    5.  Hyperlipidemia-not on statins due to muscle aches and cost    6.  Tobacco abuse - continues to smoke 1 pack a day.    7.  Insomnia    Call with BP in 1-2 weeks.  Add hydralazine 25 mg BIDAs always, it has been a pleasure to participate in your patient's care. Thank you.      RTO in 1 year with RM      Sincerely,       GUILLERMINA Miller      Current Outpatient Medications:   •  aspirin 81 MG tablet, Take by mouth daily., Disp: , Rfl:   •  carvedilol (COREG) 3.125 MG tablet, Take 1 tablet by mouth 2 (Two) Times a Day With Meals., Disp: 180 tablet, Rfl: 3  •  Cholecalciferol (VITAMIN D3) 2000 UNITS tablet, Take by mouth., Disp: , Rfl:   •  esomeprazole (NexIUM) 40 MG capsule, Take by mouth daily., Disp: , Rfl:   •  vitamin B-12 (CYANOCOBALAMIN) 2500 MCG sublingual tablet tablet, Place under the tongue daily., Disp: , Rfl:   •  vitamin B-6 (PYRIDOXINE) 100 MG tablet, Take 100 mg by mouth  daily., Disp: , Rfl:   •  hydrALAZINE (APRESOLINE) 25 MG tablet, Take 1 tablet by mouth 2 (Two) Times a Day., Disp: 180 tablet, Rfl: 3    Dictated utilizing Dragon dictation

## 2020-02-25 ENCOUNTER — OFFICE VISIT (OUTPATIENT)
Dept: CARDIOLOGY | Facility: CLINIC | Age: 75
End: 2020-02-25

## 2020-02-25 ENCOUNTER — LAB (OUTPATIENT)
Dept: LAB | Facility: HOSPITAL | Age: 75
End: 2020-02-25

## 2020-02-25 VITALS
OXYGEN SATURATION: 96 % | DIASTOLIC BLOOD PRESSURE: 100 MMHG | WEIGHT: 129 LBS | BODY MASS INDEX: 20.73 KG/M2 | HEIGHT: 66 IN | SYSTOLIC BLOOD PRESSURE: 170 MMHG | RESPIRATION RATE: 16 BRPM | HEART RATE: 65 BPM

## 2020-02-25 DIAGNOSIS — I10 ESSENTIAL HYPERTENSION: ICD-10-CM

## 2020-02-25 DIAGNOSIS — I25.10 ATHEROSCLEROSIS OF NATIVE CORONARY ARTERY OF NATIVE HEART WITHOUT ANGINA PECTORIS: Primary | ICD-10-CM

## 2020-02-25 DIAGNOSIS — I73.9 ANGIOPATHY, PERIPHERAL (HCC): ICD-10-CM

## 2020-02-25 DIAGNOSIS — I25.10 ATHEROSCLEROSIS OF NATIVE CORONARY ARTERY OF NATIVE HEART WITHOUT ANGINA PECTORIS: ICD-10-CM

## 2020-02-25 DIAGNOSIS — I25.5 CARDIOMYOPATHY, ISCHEMIC: ICD-10-CM

## 2020-02-25 LAB
ALBUMIN SERPL-MCNC: 4.6 G/DL (ref 3.5–5.2)
ALBUMIN/GLOB SERPL: 1.6 G/DL
ALP SERPL-CCNC: 74 U/L (ref 39–117)
ALT SERPL W P-5'-P-CCNC: 19 U/L (ref 1–33)
ANION GAP SERPL CALCULATED.3IONS-SCNC: 13.7 MMOL/L (ref 5–15)
AST SERPL-CCNC: 29 U/L (ref 1–32)
BASOPHILS # BLD AUTO: 0.04 10*3/MM3 (ref 0–0.2)
BASOPHILS NFR BLD AUTO: 0.6 % (ref 0–1.5)
BILIRUB SERPL-MCNC: 0.3 MG/DL (ref 0.2–1.2)
BUN BLD-MCNC: 6 MG/DL (ref 8–23)
BUN/CREAT SERPL: 7.9 (ref 7–25)
CALCIUM SPEC-SCNC: 9.2 MG/DL (ref 8.6–10.5)
CHLORIDE SERPL-SCNC: 96 MMOL/L (ref 98–107)
CO2 SERPL-SCNC: 23.3 MMOL/L (ref 22–29)
CREAT BLD-MCNC: 0.76 MG/DL (ref 0.57–1)
DEPRECATED RDW RBC AUTO: 39.5 FL (ref 37–54)
EOSINOPHIL # BLD AUTO: 0.14 10*3/MM3 (ref 0–0.4)
EOSINOPHIL NFR BLD AUTO: 2.2 % (ref 0.3–6.2)
ERYTHROCYTE [DISTWIDTH] IN BLOOD BY AUTOMATED COUNT: 12.6 % (ref 12.3–15.4)
GFR SERPL CREATININE-BSD FRML MDRD: 75 ML/MIN/1.73
GLOBULIN UR ELPH-MCNC: 2.8 GM/DL
GLUCOSE BLD-MCNC: 92 MG/DL (ref 65–99)
HCT VFR BLD AUTO: 39.3 % (ref 34–46.6)
HGB BLD-MCNC: 13.2 G/DL (ref 12–15.9)
IMM GRANULOCYTES # BLD AUTO: 0.02 10*3/MM3 (ref 0–0.05)
IMM GRANULOCYTES NFR BLD AUTO: 0.3 % (ref 0–0.5)
LYMPHOCYTES # BLD AUTO: 2.32 10*3/MM3 (ref 0.7–3.1)
LYMPHOCYTES NFR BLD AUTO: 35.9 % (ref 19.6–45.3)
MCH RBC QN AUTO: 28.9 PG (ref 26.6–33)
MCHC RBC AUTO-ENTMCNC: 33.6 G/DL (ref 31.5–35.7)
MCV RBC AUTO: 86 FL (ref 79–97)
MONOCYTES # BLD AUTO: 0.44 10*3/MM3 (ref 0.1–0.9)
MONOCYTES NFR BLD AUTO: 6.8 % (ref 5–12)
NEUTROPHILS # BLD AUTO: 3.51 10*3/MM3 (ref 1.7–7)
NEUTROPHILS NFR BLD AUTO: 54.2 % (ref 42.7–76)
NRBC BLD AUTO-RTO: 0 /100 WBC (ref 0–0.2)
PLATELET # BLD AUTO: 239 10*3/MM3 (ref 140–450)
PMV BLD AUTO: 9.6 FL (ref 6–12)
POTASSIUM BLD-SCNC: 4.3 MMOL/L (ref 3.5–5.2)
PROT SERPL-MCNC: 7.4 G/DL (ref 6–8.5)
RBC # BLD AUTO: 4.57 10*6/MM3 (ref 3.77–5.28)
SODIUM BLD-SCNC: 133 MMOL/L (ref 136–145)
WBC NRBC COR # BLD: 6.47 10*3/MM3 (ref 3.4–10.8)

## 2020-02-25 PROCEDURE — 99214 OFFICE O/P EST MOD 30 MIN: CPT | Performed by: NURSE PRACTITIONER

## 2020-02-25 PROCEDURE — 80053 COMPREHEN METABOLIC PANEL: CPT

## 2020-02-25 PROCEDURE — 93000 ELECTROCARDIOGRAM COMPLETE: CPT | Performed by: NURSE PRACTITIONER

## 2020-02-25 PROCEDURE — 36415 COLL VENOUS BLD VENIPUNCTURE: CPT

## 2020-02-25 PROCEDURE — 85025 COMPLETE CBC W/AUTO DIFF WBC: CPT

## 2020-02-25 RX ORDER — CARVEDILOL 3.12 MG/1
3.12 TABLET ORAL 2 TIMES DAILY WITH MEALS
Qty: 180 TABLET | Refills: 3 | Status: SHIPPED | OUTPATIENT
Start: 2020-02-25 | End: 2021-05-03

## 2020-02-25 RX ORDER — HYDRALAZINE HYDROCHLORIDE 25 MG/1
25 TABLET, FILM COATED ORAL 2 TIMES DAILY
Qty: 180 TABLET | Refills: 3 | Status: SHIPPED | OUTPATIENT
Start: 2020-02-25 | End: 2021-03-01

## 2021-03-01 ENCOUNTER — OFFICE VISIT (OUTPATIENT)
Dept: CARDIOLOGY | Facility: CLINIC | Age: 76
End: 2021-03-01

## 2021-03-01 VITALS
HEART RATE: 72 BPM | HEIGHT: 66 IN | DIASTOLIC BLOOD PRESSURE: 70 MMHG | WEIGHT: 132.4 LBS | SYSTOLIC BLOOD PRESSURE: 148 MMHG | BODY MASS INDEX: 21.28 KG/M2

## 2021-03-01 DIAGNOSIS — I73.9 PAD (PERIPHERAL ARTERY DISEASE) (HCC): ICD-10-CM

## 2021-03-01 DIAGNOSIS — Z72.0 TOBACCO USE: ICD-10-CM

## 2021-03-01 DIAGNOSIS — I10 ESSENTIAL HYPERTENSION: ICD-10-CM

## 2021-03-01 DIAGNOSIS — I25.810 CORONARY ARTERY DISEASE INVOLVING CORONARY BYPASS GRAFT OF NATIVE HEART WITHOUT ANGINA PECTORIS: Primary | ICD-10-CM

## 2021-03-01 DIAGNOSIS — I25.5 CARDIOMYOPATHY, ISCHEMIC: ICD-10-CM

## 2021-03-01 PROCEDURE — 93000 ELECTROCARDIOGRAM COMPLETE: CPT | Performed by: INTERNAL MEDICINE

## 2021-03-01 PROCEDURE — 99214 OFFICE O/P EST MOD 30 MIN: CPT | Performed by: INTERNAL MEDICINE

## 2021-03-01 RX ORDER — SIMVASTATIN 20 MG
20 TABLET ORAL NIGHTLY
Qty: 90 TABLET | Refills: 3 | Status: SHIPPED | OUTPATIENT
Start: 2021-03-01 | End: 2022-05-03

## 2021-03-01 RX ORDER — LOSARTAN POTASSIUM AND HYDROCHLOROTHIAZIDE 12.5; 5 MG/1; MG/1
1 TABLET ORAL DAILY
Qty: 90 TABLET | Refills: 3 | Status: SHIPPED | OUTPATIENT
Start: 2021-03-01 | End: 2022-05-03

## 2021-03-01 NOTE — PROGRESS NOTES
Date of Office Visit: 2021  Encounter Provider: Olamide Whaley MD  Place of Service: [facility] CARDIOLOGY  Patient Name: Marlin Pedroza  :1945      Patient ID:  Marlin Pedroza is a 75 y.o. female is here for  followup for CAD, PAD        History of Present Illness    She is known to have significant claudication of the right lower extremity and follows  with Dr. Pinto. She has severe disease involving the right external iliac, right  common iliac, occlusion of the right superficial femoral artery with single vessel runoff  to the right foot posterior tibial artery. She, on the left side, has severe disease, but  patent left superficial femoral artery on the left. She is also known to have carotid  artery disease and had a carotid artery duplex done 2013, showing 15-49% left  internal carotid artery stenosis and 50-60% right internal carotid artery stenosis.  Because of the amount of claudication she has in the right foot she is in need of some  sort of intervention for that leg, so Dr. Pinto is planning for 2013 for  stenting, possibly open procedure, to the right lower extremity.       When I saw her in the office, she had an abnormal electrocardiogram showing inferior  Q-waves, as well as T-wave inversion in the inferior leads. I was very concerned about  coronary artery disease, and she complained of some chest pain and some short-windedness.  Because of her symptoms that very day, on 10/03/2013, we set the patient up for an  echocardiogram which showed an ejection fraction of 42% and mild left ventricular   dilation, inferior wall and septal wall moderate hypokinesis. There was moderate mitral  insufficiency, mild tricuspid insufficiency and grade 1 diastolic dysfunction. She also  had a stress PET study done 10/10/03/2013, which showed a small area of infarct, in the  inferior wall there was a large amount of maryam-infarct ischemia. We took her to  the  cardiac catheterization lab, which was done then, 10/09/2013. This showed no heart  failure. Her ejection fraction was 50% with inferobasilar akinesis and mild inferior wall  hypokinesis. The left anterior descending had sequential proximal 40% stenosis and 50-60%  mid-vessel stenosis. The first diagonal branch was long and patent. The left circumflex  vessel was widely patent and the obtuse marginal branch, which was a moderate vessel, had  20% proximal stenosis. The right coronary artery was 100% occluded proximally and filled  briskly SOFIE-1 flow by ipsilateral and contralateral collaterals. At that time, I  recommended medical changes so that she could go ahead and get intervention of her leg  done.       She has pretty bad Raynaud's disease, and it has worsened since  being on Coreg. In addition, she says the atorvastatin causes her to cough and keeps her  from sleeping.       On 11/25/2013, she did have intervention to her right leg. She had right common and deep  femoral endarterectomy with Vascu-Guard patch. She also had a right common and an  external iliac stent placed.      She saw vascular 8/2019 and had carotid duplex showing 60-70% YONATHAN stenosis and 50-60% LICA stenosis.  Vertebral arteries had antegrade flow.  Her LAYNE on the right was 0.69 and on the left 0.95.     Labs done 2/25/20 show sodium 133, otherwise normal CMP, normal CBC.    She has no chest pain or pressure.  She has no tachycardia or dizziness.  She has no orthopnea or PND.  She has no exertional dyspnea.  She cannot tolerate hydralazine due to low blood pressure.  She is not taking it.  She is on no lipid medications.  She is smoking half pack cigarettes a day.  She does continue to see vascular.    Addendum: Her last peripheral examination was done 8/1/2020 showing patent abdominal aorta, patent right common and external iliac arterial stents and widely patent common femoral artery on the right after endarterectomy.  The left  common and external iliac arteries had extensive calcification with mild stenosis which was unchanged from prior exam.  Her LAYNE showed moderate occlusive disease bilaterally which was unchanged.  Her carotid study showed 60 to 70% right internal carotid artery stenosis and 60 to 70% left internal carotid artery stenosis which was really fairly stable the left had progressed slightly.  She has been following with Dr. Clayton.    Past Medical History:   Diagnosis Date   • CAD (coronary artery disease)    • Carotid artery stenosis    • Hypertension    • Intermittent claudication (CMS/[facility])    • Ischemic cardiomyopathy    • PAD (peripheral artery disease) (CMS/[facility])    • Stenosis of iliac artery (CMS/[facility])          Past Surgical History:   Procedure Laterality Date   • CARDIAC CATHETERIZATION     • CORONARY ANGIOPLASTY  10/2013   • HERNIA REPAIR     • HYSTERECTOMY         Current Outpatient Medications on File Prior to Visit   Medication Sig Dispense Refill   • aspirin 81 MG tablet Take by mouth daily.     • carvedilol (COREG) 3.125 MG tablet Take 1 tablet by mouth 2 (Two) Times a Day With Meals. 180 tablet 3   • Cholecalciferol (VITAMIN D3) 2000 UNITS tablet Take by mouth.     • esomeprazole (NexIUM) 40 MG capsule Take by mouth daily.     • vitamin B-12 (CYANOCOBALAMIN) 2500 MCG sublingual tablet tablet Place under the tongue daily.     • vitamin B-6 (PYRIDOXINE) 100 MG tablet Take 100 mg by mouth daily.     • [DISCONTINUED] hydrALAZINE (APRESOLINE) 25 MG tablet Take 1 tablet by mouth 2 (Two) Times a Day. 180 tablet 3     No current facility-administered medications on file prior to visit.        Social History     Socioeconomic History   • Marital status:      Spouse name: Not on file   • Number of children: Not on file   • Years of education: Not on file   • Highest education level: Not on file   Tobacco Use   • Smoking status: Current Every Day Smoker     Packs/day: 0.50   • Smokeless tobacco: Never Used  "  Substance and Sexual Activity   • Alcohol use: No     Comment: Caffeine use: 2-4 cups daily   • Drug use: Never     Comment: CAFFEINE USE           ROS    Procedures    ECG 12 Lead    Date/Time: 3/1/2021 9:52 AM  Performed by: Olamide Whaley MD  Authorized by: Olamide Whaley MD   Comparison: compared with previous ECG   Comparison to previous ECG: Now pvcs  Rhythm: sinus rhythm  Ectopy: unifocal PVCs  Q waves: aVF, V4 and V3    T inversion: aVF    Clinical impression: abnormal EKG                Objective:      Vitals:    03/01/21 0941   BP: 148/70   BP Location: Left arm   Pulse: 72   Weight: 60.1 kg (132 lb 6.4 oz)   Height: 167.6 cm (66\")     Body mass index is 21.37 kg/m².    Vitals signs reviewed.   Constitutional:       General: Not in acute distress.     Appearance: Well-developed. Not diaphoretic.   Eyes:      General: No scleral icterus.     Conjunctiva/sclera: Conjunctivae normal.   HENT:      Head: Normocephalic and atraumatic.   Neck:      Musculoskeletal: Neck supple.      Thyroid: No thyromegaly.      Vascular: No carotid bruit or JVD.      Lymphadenopathy: No cervical adenopathy.   Pulmonary:      Effort: Pulmonary effort is normal. No respiratory distress.      Breath sounds: Normal breath sounds. No wheezing. No rhonchi. No rales.   Chest:      Chest wall: Not tender to palpatation.   Cardiovascular:      Normal rate. Regular rhythm.      Murmurs: There is no murmur.      No gallop.   Pulses:     Intact distal pulses.   Edema:     Peripheral edema absent.   Abdominal:      General: Bowel sounds are normal. There is no distension or abdominal bruit.      Palpations: Abdomen is soft. There is no abdominal mass.      Tenderness: There is no abdominal tenderness.   Musculoskeletal:         General: No deformity.      Extremities: No clubbing present.  Skin:     General: Skin is warm and dry. There is no cyanosis.      Coloration: Skin is not pale.      Findings: No rash.   Neurological: "      Mental Status: Alert and oriented to person, place, and time.      Cranial Nerves: No cranial nerve deficit.   Psychiatric:         Judgment: Judgment normal.         Lab Review:       Assessment:      Diagnosis Plan   1. Coronary artery disease involving coronary bypass graft of native heart without angina pectoris     2. Cardiomyopathy, ischemic     3. Essential hypertension     4. PAD (peripheral artery disease) (CMS/[facility])     5. Tobacco use       1. PAD, sees Dr. Pinto, s/p right femoral endartectomy and stents in the right iliac.    2. CAD. Occluded RCA with collaterals, no CHF or angina.   3. Tobacco use. Needs to stop smoking, and she knows this and we discuss this at every visit.    4. HTN, on coreg, off benazepril due to cough, not on losartan due to cost.   5. Moderate right internal carotid artery stenosis with mild left internal carotid artery  stenosis.   6. HLD, not taking any statins.        Plan:       We will get records from vascular, try simvastatin 20 mg nightly and start losartan HCT 50/12.5 daily.  See Angie in 1 year, no further testing at this time.  She would like to try Nicorette gum to stop smoking.

## 2021-05-03 RX ORDER — CARVEDILOL 3.12 MG/1
TABLET ORAL
Qty: 180 TABLET | Refills: 2 | Status: SHIPPED | OUTPATIENT
Start: 2021-05-03 | End: 2022-02-01

## 2022-01-18 ENCOUNTER — OFFICE (OUTPATIENT)
Dept: URBAN - NONMETROPOLITAN AREA CLINIC 13 | Facility: CLINIC | Age: 77
End: 2022-01-18
Payer: COMMERCIAL

## 2022-01-18 VITALS
DIASTOLIC BLOOD PRESSURE: 122 MMHG | HEART RATE: 69 BPM | HEIGHT: 68 IN | SYSTOLIC BLOOD PRESSURE: 177 MMHG | DIASTOLIC BLOOD PRESSURE: 92 MMHG | OXYGEN SATURATION: 99 % | SYSTOLIC BLOOD PRESSURE: 146 MMHG | WEIGHT: 198 LBS

## 2022-01-18 VITALS — HEIGHT: 68 IN

## 2022-01-18 DIAGNOSIS — R19.7 DIARRHEA, UNSPECIFIED: ICD-10-CM

## 2022-01-18 DIAGNOSIS — K21.9 GASTRO-ESOPHAGEAL REFLUX DISEASE WITHOUT ESOPHAGITIS: ICD-10-CM

## 2022-01-18 DIAGNOSIS — R13.10 DYSPHAGIA, UNSPECIFIED: ICD-10-CM

## 2022-01-18 DIAGNOSIS — R10.12 LEFT UPPER QUADRANT PAIN: ICD-10-CM

## 2022-01-18 DIAGNOSIS — E83.119 HEMOCHROMATOSIS, UNSPECIFIED: ICD-10-CM

## 2022-01-18 PROCEDURE — 76700 US EXAM ABDOM COMPLETE: CPT | Mod: TC | Performed by: NURSE PRACTITIONER

## 2022-01-18 PROCEDURE — 99204 OFFICE O/P NEW MOD 45 MIN: CPT | Mod: 25 | Performed by: NURSE PRACTITIONER

## 2022-01-19 LAB
AMYLASE: 53 UNITS/L (ref 30–110)
CBC WITH WBC (DIFF): ACANTHOCYTE: NORMAL
CBC WITH WBC (DIFF): AGRANULAR NEUTROPHIL: NORMAL
CBC WITH WBC (DIFF): ANISOCYTOSIS: NORMAL
CBC WITH WBC (DIFF): ATYPICAL LYMPHOCYTE: NORMAL
CBC WITH WBC (DIFF): AUER RODS: NORMAL
CBC WITH WBC (DIFF): BAND: NORMAL
CBC WITH WBC (DIFF): BASOPHIL: 0 % (ref 0–1)
CBC WITH WBC (DIFF): BASOPHILIC STIPPLING: NORMAL
CBC WITH WBC (DIFF): BI-LOBED NEUTROPHIL: NORMAL
CBC WITH WBC (DIFF): BLAST: NORMAL
CBC WITH WBC (DIFF): BURR CELL: NORMAL
CBC WITH WBC (DIFF): DIMORPHIC RBC POPULATION: NORMAL
CBC WITH WBC (DIFF): DOHLE BODIES: NORMAL
CBC WITH WBC (DIFF): ELLIPTOCYTE: NORMAL
CBC WITH WBC (DIFF): EOSINOPHIL: 2 % (ref 0–5)
CBC WITH WBC (DIFF): GIANT PLATELET: NORMAL
CBC WITH WBC (DIFF): HEMATOCRIT: 44.4 % (ref 36–46)
CBC WITH WBC (DIFF): HEMOGLOBIN: 14.7 G/DL (ref 12–16)
CBC WITH WBC (DIFF): HOWELL JOLLY BODIES: NORMAL
CBC WITH WBC (DIFF): HYPERSEGMENTED NEUTROPHIL: NORMAL
CBC WITH WBC (DIFF): HYPOCHROMIA: NORMAL
CBC WITH WBC (DIFF): HYPOGRANULAR NEUTROPHIL: NORMAL
CBC WITH WBC (DIFF): IMMATURE GRANULOCYTES: 0 % (ref 0–1)
CBC WITH WBC (DIFF): LARGE PLATELET: NORMAL
CBC WITH WBC (DIFF): LYMPHOCYTE: 22 % (ref 20–40)
CBC WITH WBC (DIFF): MACROCYTOSIS: NORMAL
CBC WITH WBC (DIFF): MEAN CELL HEMOGLOBIN CONCENTRATION: 33.1 G/DL (ref 33–37)
CBC WITH WBC (DIFF): MEAN CELL HEMOGLOBIN: 32.4 PG — HIGH (ref 27–31)
CBC WITH WBC (DIFF): MEAN CELL VOLUME: 98 FL (ref 84–100)
CBC WITH WBC (DIFF): MEAN PLATELET VOLUME: 11.1 FL (ref 8.3–11.9)
CBC WITH WBC (DIFF): METAMYELOCYTE: NORMAL
CBC WITH WBC (DIFF): MICROCYTOSIS: NORMAL
CBC WITH WBC (DIFF): MIX CELLS: NORMAL
CBC WITH WBC (DIFF): MONOCYTE: 8 % (ref 1–10)
CBC WITH WBC (DIFF): MYELOCYTE: NORMAL
CBC WITH WBC (DIFF): NEUTROPHIL SEGMENTED: 67 % (ref 50–70)
CBC WITH WBC (DIFF): NOTE: NORMAL
CBC WITH WBC (DIFF): NUCLEATED RBC: 0 /100WBC (ref 0–0)
CBC WITH WBC (DIFF): OVALOCYTE: NORMAL
CBC WITH WBC (DIFF): PAPPENHEIMER BODIES: NORMAL
CBC WITH WBC (DIFF): PATHOLOGIST INTERPRETATION: NORMAL
CBC WITH WBC (DIFF): PLATELET CLUMPS: NORMAL
CBC WITH WBC (DIFF): PLATELET COUNT: 243 /NL (ref 140–440)
CBC WITH WBC (DIFF): PLT SATELLITOSIS: NORMAL
CBC WITH WBC (DIFF): POIKILOCYTOSIS: NORMAL
CBC WITH WBC (DIFF): POLYCHROMASIA: NORMAL
CBC WITH WBC (DIFF): PROMYELOCYTE: NORMAL
CBC WITH WBC (DIFF): RBC MORPHOLOGY: NORMAL
CBC WITH WBC (DIFF): REACT LYMPH ABS MAN: NORMAL
CBC WITH WBC (DIFF): REACTIVE LYMPHOCYTE: NORMAL
CBC WITH WBC (DIFF): RED BLOOD CELL: 4.54 /PL (ref 4.2–5.4)
CBC WITH WBC (DIFF): RED CELL DIAMETER WIDTH: 50 FL — HIGH (ref 35–48)
CBC WITH WBC (DIFF): ROULEAUX RBC: NORMAL
CBC WITH WBC (DIFF): SCHISTOCYTE: NORMAL
CBC WITH WBC (DIFF): SICKLE CELL: NORMAL
CBC WITH WBC (DIFF): SMUDGE CELLS: NORMAL
CBC WITH WBC (DIFF): SPHEROCYTE: NORMAL
CBC WITH WBC (DIFF): STOMATOCYTE: NORMAL
CBC WITH WBC (DIFF): TARGET CELL: NORMAL
CBC WITH WBC (DIFF): TEAR DROP CELL: NORMAL
CBC WITH WBC (DIFF): TOXIC GRANULATION: NORMAL
CBC WITH WBC (DIFF): UNCLASSIFIED CELL: NORMAL
CBC WITH WBC (DIFF): VACUOLATED NEUTROPHIL: NORMAL
CBC WITH WBC (DIFF): WBC MORPHOLOGY: NORMAL
CBC WITH WBC (DIFF): WHITE BLOOD CELL: 8.5 /NL (ref 4.8–11)
COMPREHENSIVE METABOLIC PANEL: ALBUMIN: 4.1 G/DL (ref 3.5–4.8)
COMPREHENSIVE METABOLIC PANEL: ALKALINE PHOSPHATASE: 53 UNITS/L (ref 36–126)
COMPREHENSIVE METABOLIC PANEL: ALT: 29 UNITS/L (ref ?–34)
COMPREHENSIVE METABOLIC PANEL: ANION GAP: 7 MMOL/L (ref 7–16)
COMPREHENSIVE METABOLIC PANEL: AST: 30 UNITS/L (ref 14–36)
COMPREHENSIVE METABOLIC PANEL: BILIRUBIN, TOTAL: 0.4 MG/DL (ref 0.2–1.3)
COMPREHENSIVE METABOLIC PANEL: BUN/CREATININE RATIO: 10.7
COMPREHENSIVE METABOLIC PANEL: CALCIUM: 9.2 MG/DL (ref 8.4–10.2)
COMPREHENSIVE METABOLIC PANEL: CARBON DIOXIDE: 28 MMOL/L (ref 22–30)
COMPREHENSIVE METABOLIC PANEL: CHLORIDE: 104 MMOL/L (ref 98–107)
COMPREHENSIVE METABOLIC PANEL: CREATININE: 0.75 MG/DL (ref 0.52–1.04)
COMPREHENSIVE METABOLIC PANEL: GLUCOSE: 88 MG/DL (ref 65–105)
COMPREHENSIVE METABOLIC PANEL: POTASSIUM: 3.5 MMOL/L (ref 3.5–5.3)
COMPREHENSIVE METABOLIC PANEL: PROTEIN, TOTAL, SERUM: 6.3 G/DL (ref 6.3–8.2)
COMPREHENSIVE METABOLIC PANEL: SODIUM: 139 MMOL/L (ref 137–145)
COMPREHENSIVE METABOLIC PANEL: UREA NITROGEN (BUN): 8 MG/DL (ref 7–17)
FERRITIN: 139 NG/ML (ref 11–264)
GFR: EGFR AFRICAN AMERICAN: >60 ML/MIN/1.73M2
GFR: EGFR NON-AFR.AMERICAN: >60 ML/MIN/1.73M2
IRON AND TOTAL IRON BINDING CAPACITY: % SATURATION: 32 % (ref 11–40)
IRON AND TOTAL IRON BINDING CAPACITY: IRON BINDING CAPACITY: 271 MCG/DL (ref 265–497)
IRON AND TOTAL IRON BINDING CAPACITY: IRON, TOTAL: 86 MCG/DL (ref 37–170)
LIPASE: 82 UNITS/L (ref 23–300)

## 2022-02-01 RX ORDER — CARVEDILOL 3.12 MG/1
TABLET ORAL
Qty: 180 TABLET | Refills: 0 | Status: SHIPPED | OUTPATIENT
Start: 2022-02-01 | End: 2022-05-03

## 2022-02-10 ENCOUNTER — AMBULATORY SURGICAL CENTER (OUTPATIENT)
Dept: URBAN - NONMETROPOLITAN AREA SURGERY 2 | Facility: SURGERY | Age: 77
End: 2022-02-10
Payer: COMMERCIAL

## 2022-02-10 VITALS
DIASTOLIC BLOOD PRESSURE: 94 MMHG | HEART RATE: 95 BPM | RESPIRATION RATE: 9 BRPM | HEART RATE: 80 BPM | SYSTOLIC BLOOD PRESSURE: 141 MMHG | HEART RATE: 85 BPM | SYSTOLIC BLOOD PRESSURE: 137 MMHG | HEART RATE: 89 BPM | SYSTOLIC BLOOD PRESSURE: 140 MMHG | WEIGHT: 198 LBS | DIASTOLIC BLOOD PRESSURE: 102 MMHG | RESPIRATION RATE: 15 BRPM | DIASTOLIC BLOOD PRESSURE: 82 MMHG | TEMPERATURE: 98 F | SYSTOLIC BLOOD PRESSURE: 186 MMHG | DIASTOLIC BLOOD PRESSURE: 88 MMHG | DIASTOLIC BLOOD PRESSURE: 101 MMHG | SYSTOLIC BLOOD PRESSURE: 152 MMHG | RESPIRATION RATE: 16 BRPM | RESPIRATION RATE: 13 BRPM | HEART RATE: 84 BPM | RESPIRATION RATE: 10 BRPM | OXYGEN SATURATION: 98 % | OXYGEN SATURATION: 97 % | OXYGEN SATURATION: 95 % | OXYGEN SATURATION: 99 % | HEART RATE: 86 BPM | SYSTOLIC BLOOD PRESSURE: 166 MMHG | OXYGEN SATURATION: 96 % | HEIGHT: 68 IN | DIASTOLIC BLOOD PRESSURE: 98 MMHG

## 2022-02-10 DIAGNOSIS — K31.89 OTHER DISEASES OF STOMACH AND DUODENUM: ICD-10-CM

## 2022-02-10 DIAGNOSIS — K22.2 ESOPHAGEAL OBSTRUCTION: ICD-10-CM

## 2022-02-10 DIAGNOSIS — R13.10 DYSPHAGIA, UNSPECIFIED: ICD-10-CM

## 2022-02-10 DIAGNOSIS — K21.9 GASTRO-ESOPHAGEAL REFLUX DISEASE WITHOUT ESOPHAGITIS: ICD-10-CM

## 2022-02-10 PROBLEM — K30 DYSPEPSIA: Status: ACTIVE | Noted: 2022-02-10

## 2022-02-10 LAB
LIPID PANEL: CHOLESTEROL, TOTAL: 204 MG/DL
LIPID PANEL: HDL CHOLESTEROL: 37 MG/DL
LIPID PANEL: LDL-CHOLESTEROL: 128 MG/DL
LIPID PANEL: TRIGLYCERIDES: 246 MG/DL

## 2022-02-10 PROCEDURE — 43248 EGD GUIDE WIRE INSERTION: CPT | Performed by: INTERNAL MEDICINE

## 2022-02-10 PROCEDURE — 43239 EGD BIOPSY SINGLE/MULTIPLE: CPT | Mod: 59 | Performed by: INTERNAL MEDICINE

## 2022-02-10 RX ORDER — PANTOPRAZOLE SODIUM 40 MG/1
TABLET, DELAYED RELEASE ORAL
Qty: 90 | Refills: 1 | Status: ACTIVE
Start: 2022-02-10

## 2022-02-10 RX ORDER — FAMOTIDINE 40 MG/1
TABLET, FILM COATED ORAL
Qty: 90 | Refills: 1 | Status: ACTIVE
Start: 2022-02-10

## 2022-02-10 RX ORDER — VITAMIN E 268 MG
CAPSULE ORAL
Qty: 100 | Refills: 5 | Status: ACTIVE
Start: 2022-02-10

## 2022-02-15 LAB
PATHOLOGY REPORT: APSREPORT: (no result) 1
PATHOLOGY REPORT: PDF: (no result) 1

## 2022-03-07 ENCOUNTER — AMBULATORY SURGICAL CENTER (OUTPATIENT)
Dept: URBAN - NONMETROPOLITAN AREA SURGERY 2 | Facility: SURGERY | Age: 77
End: 2022-03-07
Payer: COMMERCIAL

## 2022-03-07 VITALS
SYSTOLIC BLOOD PRESSURE: 103 MMHG | HEART RATE: 70 BPM | RESPIRATION RATE: 16 BRPM | RESPIRATION RATE: 18 BRPM | DIASTOLIC BLOOD PRESSURE: 83 MMHG | DIASTOLIC BLOOD PRESSURE: 107 MMHG | RESPIRATION RATE: 15 BRPM | DIASTOLIC BLOOD PRESSURE: 66 MMHG | SYSTOLIC BLOOD PRESSURE: 154 MMHG | DIASTOLIC BLOOD PRESSURE: 73 MMHG | HEART RATE: 57 BPM | HEART RATE: 79 BPM | RESPIRATION RATE: 13 BRPM | RESPIRATION RATE: 14 BRPM | SYSTOLIC BLOOD PRESSURE: 151 MMHG | DIASTOLIC BLOOD PRESSURE: 68 MMHG | HEART RATE: 64 BPM | SYSTOLIC BLOOD PRESSURE: 128 MMHG | OXYGEN SATURATION: 99 % | HEART RATE: 63 BPM | DIASTOLIC BLOOD PRESSURE: 41 MMHG | DIASTOLIC BLOOD PRESSURE: 88 MMHG | HEART RATE: 60 BPM | WEIGHT: 198 LBS | HEART RATE: 67 BPM | DIASTOLIC BLOOD PRESSURE: 61 MMHG | SYSTOLIC BLOOD PRESSURE: 134 MMHG | OXYGEN SATURATION: 96 % | SYSTOLIC BLOOD PRESSURE: 126 MMHG | RESPIRATION RATE: 17 BRPM | SYSTOLIC BLOOD PRESSURE: 106 MMHG | HEART RATE: 56 BPM | OXYGEN SATURATION: 95 % | HEART RATE: 66 BPM | DIASTOLIC BLOOD PRESSURE: 74 MMHG | HEIGHT: 68 IN | OXYGEN SATURATION: 94 % | OXYGEN SATURATION: 98 % | SYSTOLIC BLOOD PRESSURE: 142 MMHG | SYSTOLIC BLOOD PRESSURE: 147 MMHG | TEMPERATURE: 97.7 F

## 2022-03-07 DIAGNOSIS — R19.4 CHANGE IN BOWEL HABIT: ICD-10-CM

## 2022-03-07 DIAGNOSIS — R19.7 DIARRHEA, UNSPECIFIED: ICD-10-CM

## 2022-03-07 PROCEDURE — 45378 DIAGNOSTIC COLONOSCOPY: CPT | Performed by: INTERNAL MEDICINE

## 2022-03-07 RX ORDER — DOCUSATE SODIUM AND SENNOSIDES 50; 8.6 MG/1; MG/1
17.2 TABLET, FILM COATED ORAL
Qty: 60 | Refills: 3 | Status: ACTIVE
Start: 2022-03-07

## 2022-05-02 NOTE — TELEPHONE ENCOUNTER
Last OV-3/1/22-TK  Next OV-called and left a message to schedule an appointment.      Plan:     We will get records from vascular, try simvastatin 20 mg nightly and start losartan HCT 50/12.5 daily.  See Angie in 1 year, no further testing at this time.  She would like to try Nicorette gum to stop smoking.

## 2022-05-03 ENCOUNTER — OFFICE VISIT (OUTPATIENT)
Dept: CARDIOLOGY | Facility: CLINIC | Age: 77
End: 2022-05-03

## 2022-05-03 VITALS
DIASTOLIC BLOOD PRESSURE: 90 MMHG | RESPIRATION RATE: 16 BRPM | OXYGEN SATURATION: 97 % | BODY MASS INDEX: 21.21 KG/M2 | HEIGHT: 66 IN | SYSTOLIC BLOOD PRESSURE: 160 MMHG | WEIGHT: 132 LBS | HEART RATE: 64 BPM

## 2022-05-03 DIAGNOSIS — I10 PRIMARY HYPERTENSION: ICD-10-CM

## 2022-05-03 DIAGNOSIS — I25.5 CARDIOMYOPATHY, ISCHEMIC: ICD-10-CM

## 2022-05-03 DIAGNOSIS — I73.9 PAD (PERIPHERAL ARTERY DISEASE): ICD-10-CM

## 2022-05-03 DIAGNOSIS — I65.29 ASYMPTOMATIC CAROTID ARTERY STENOSIS WITHOUT INFARCTION, UNSPECIFIED LATERALITY: ICD-10-CM

## 2022-05-03 DIAGNOSIS — I25.810 CORONARY ARTERY DISEASE INVOLVING CORONARY BYPASS GRAFT OF NATIVE HEART WITHOUT ANGINA PECTORIS: Primary | ICD-10-CM

## 2022-05-03 PROCEDURE — 93000 ELECTROCARDIOGRAM COMPLETE: CPT | Performed by: NURSE PRACTITIONER

## 2022-05-03 PROCEDURE — 99214 OFFICE O/P EST MOD 30 MIN: CPT | Performed by: NURSE PRACTITIONER

## 2022-05-03 RX ORDER — CARVEDILOL 3.12 MG/1
TABLET ORAL
Qty: 180 TABLET | Refills: 0 | Status: SHIPPED | OUTPATIENT
Start: 2022-05-03 | End: 2022-08-03

## 2022-05-03 NOTE — PROGRESS NOTES
Date of Office Visit: 2022  Encounter Provider: GUILLERMINA Miller  Place of Service: [facility] CARDIOLOGY  Patient Name: Marlin Pedroza  :1945  Primary Cardiologist: Dr. Whaley    CC:  Annual cardiac evaluation    Dear Dr. Boone    HPI: Marlin Pedroza is a pleasant 76 y.o. female who presents 2022 for cardiac follow up.     She is known to have significant claudication of the right lower extremity and follows  with Dr. Pinto. She has severe disease involving the right external iliac, right  common iliac, occlusion of the right superficial femoral artery with single vessel runoff  to the right foot posterior tibial artery. She, on the left side, has severe disease, but  patent left superficial femoral artery on the left. She is also known to have carotid  artery disease and had a carotid artery duplex done 2013, showing 15-49% left  internal carotid artery stenosis and 50-60% right internal carotid artery stenosis.  Because of the amount of claudication she has in the right foot she is in need of some  sort of intervention for that leg, so Dr. Pinto is planning for 2013 for  stenting, possibly open procedure, to the right lower extremity.       When I saw her in the office, she had an abnormal electrocardiogram showing inferior  Q-waves, as well as T-wave inversion in the inferior leads. I was very concerned about  coronary artery disease, and she complained of some chest pain and some short-windedness.  Because of her symptoms that very day, on 10/03/2013, we set the patient up for an  echocardiogram which showed an ejection fraction of 42% and mild left ventricular   dilation, inferior wall and septal wall moderate hypokinesis. There was moderate mitral  insufficiency, mild tricuspid insufficiency and grade 1 diastolic dysfunction. She also  had a stress PET study done 10/10/03/2013, which showed a small area of infarct, in the  inferior wall  there was a large amount of maryam-infarct ischemia. We took her to the  cardiac catheterization lab, which was done then, 10/09/2013. This showed no heart  failure. Her ejection fraction was 50% with inferobasilar akinesis and mild inferior wall  hypokinesis. The left anterior descending had sequential proximal 40% stenosis and 50-60%  mid-vessel stenosis. The first diagonal branch was long and patent. The left circumflex  vessel was widely patent and the obtuse marginal branch, which was a moderate vessel, had  20% proximal stenosis. The right coronary artery was 100% occluded proximally and filled  briskly SOFIE-1 flow by ipsilateral and contralateral collaterals. At that time, I  recommended medical changes so that she could go ahead and get intervention of her leg  done.       She has pretty bad Raynaud's disease, and it has worsened since being on Coreg. In addition, she says the atorvastatin causes her to cough and keeps her from sleeping.       On 11/25/2013, she did have intervention to her right leg. She had right common and deep femoral endarterectomy with Vascu-Guard patch. She also had a right common and an external iliac stent placed.      She saw vascular 8/2019 and had carotid duplex showing 60-70% YONATHAN stenosis and 50-60% LICA stenosis.  Vertebral arteries had antegrade flow.  Her LAYNE on the right was 0.69 and on the left 0.95.     Labs done 2/25/20 show sodium 133, otherwise normal CMP, normal CBC.     She saw Dr. Whaley 3/1/2021. She has no chest pain or pressure.  She has no tachycardia or dizziness.  She has no orthopnea or PND.  She has no exertional dyspnea.  She cannot tolerate hydralazine due to low blood pressure.  She is not taking it.  She is on no lipid medications.  She is smoking half pack cigarettes a day.  She does continue to see vascular.     Addendum: Her last peripheral examination was done 8/1/2020 showing patent abdominal aorta, patent right common and external iliac arterial  stents and widely patent common femoral artery on the right after endarterectomy.  The left common and external iliac arteries had extensive calcification with mild stenosis which was unchanged from prior exam.  Her LAYNE showed moderate occlusive disease bilaterally which was unchanged.  Her carotid study showed 60 to 70% right internal carotid artery stenosis and 60 to 70% left internal carotid artery stenosis which was really fairly stable the left had progressed slightly.  She has been following with Dr. Clayton.    She denies chest ain or pressure, edema, SOA or palpitations.  She states she has fatigue that is unchanged.  She still has some dizziness that is unchanged.  She is only taking carvedilol and stopped taking losartan.  She states she called our office to report her home blood pressures but no one ever answered the phone, so she quit taking it.  She states it made her feel terrible and he BP got too low.  Last office note by Dr. Whaley stated she was not taking it due to cost.  She does not check her blood pressure at home.  She continues to smoke.  She sees Dr. Clayton in August.    Past Medical History:   Diagnosis Date   • CAD (coronary artery disease)    • Carotid artery stenosis    • Hypertension    • Intermittent claudication (HCC)    • Ischemic cardiomyopathy    • PAD (peripheral artery disease) ([facility])    • Stenosis of iliac artery (HCC)        Past Surgical History:   Procedure Laterality Date   • CARDIAC CATHETERIZATION     • CORONARY ANGIOPLASTY  10/2013   • HERNIA REPAIR     • HYSTERECTOMY         Social History     Socioeconomic History   • Marital status:    Tobacco Use   • Smoking status: Current Every Day Smoker     Packs/day: 0.50   • Smokeless tobacco: Never Used   Substance and Sexual Activity   • Alcohol use: No     Comment: Caffeine use: 2-4 cups daily   • Drug use: Never     Comment: CAFFEINE USE       Family History   Problem Relation Age of Onset   • Anuerysm Father         The following portion of the patient's history were reviewed and updated as appropriate: past medical history, past surgical history, past social history, past family history, allergies, current medications, and problem list.    Review of Systems   Constitutional: Positive for malaise/fatigue. Negative for diaphoresis and fever.   HENT: Negative for congestion, hearing loss, hoarse voice, nosebleeds and sore throat.    Eyes: Negative for photophobia, vision loss in left eye, vision loss in right eye and visual disturbance.   Cardiovascular: Negative for chest pain, dyspnea on exertion, irregular heartbeat, leg swelling, near-syncope, orthopnea, palpitations, paroxysmal nocturnal dyspnea and syncope.   Respiratory: Negative for cough, hemoptysis, shortness of breath, sleep disturbances due to breathing, snoring, sputum production and wheezing.    Endocrine: Negative for cold intolerance, heat intolerance, polydipsia, polyphagia and polyuria.   Hematologic/Lymphatic: Negative for bleeding problem. Does not bruise/bleed easily.   Skin: Negative for color change, dry skin, poor wound healing, rash and suspicious lesions.   Musculoskeletal: Negative for arthritis, back pain, falls, gout, joint pain, joint swelling, muscle cramps, muscle weakness and myalgias.   Gastrointestinal: Negative for bloating, abdominal pain, constipation, diarrhea, dysphagia, melena, nausea and vomiting.   Neurological: Positive for dizziness. Negative for excessive daytime sleepiness, headaches, light-headedness, loss of balance, numbness, paresthesias, seizures, vertigo and weakness.   Psychiatric/Behavioral: Negative for depression, memory loss and substance abuse. The patient is not nervous/anxious.        Allergies   Allergen Reactions   • Morphine And Related    • Penicillins Rash         Current Outpatient Medications:   •  aspirin 81 MG tablet, Take by mouth daily., Disp: , Rfl:   •  carvedilol (COREG) 3.125 MG tablet, TAKE 1  "TABLET BY MOUTH TWICE DAILY WITH MEALS, Disp: 180 tablet, Rfl: 0  •  Cholecalciferol (VITAMIN D3) 2000 UNITS tablet, Take by mouth., Disp: , Rfl:   •  esomeprazole (NexIUM) 40 MG capsule, Take by mouth daily., Disp: , Rfl:   •  nicotine polacrilex (Nicorette) 4 MG gum, Chew 1 each As Needed for Smoking Cessation., Disp: 90 each, Rfl: 3  •  vitamin B-12 (CYANOCOBALAMIN) 2500 MCG sublingual tablet tablet, Place under the tongue daily., Disp: , Rfl:   •  vitamin B-6 (PYRIDOXINE) 100 MG tablet, Take 100 mg by mouth daily., Disp: , Rfl:         Objective:     Vitals:    05/03/22 0913   BP: 160/90   Pulse: 64   Resp: 16   SpO2: 97%   Weight: 59.9 kg (132 lb)   Height: 167.6 cm (66\")     Body mass index is 21.31 kg/m².      Vitals reviewed.   Constitutional:       General: Not in acute distress.     Appearance: Normal and healthy appearance. Well-developed and not in distress.   Eyes:      General:         Right eye: No discharge.         Left eye: No discharge.      Conjunctiva/sclera: Conjunctivae normal.   HENT:      Head: Normocephalic and atraumatic.      Right Ear: External ear normal.      Left Ear: External ear normal.      Nose: Nose normal.   Neck:      Thyroid: No thyromegaly.      Vascular: Carotid bruit present. No JVD.      Trachea: No tracheal deviation.      Lymphadenopathy: No cervical adenopathy.      Comments: Right bruit  Pulmonary:      Effort: Pulmonary effort is normal. No respiratory distress.      Breath sounds: Normal breath sounds. No wheezing. No rales.   Chest:      Chest wall: Not tender to palpatation.   Cardiovascular:      Normal rate. Regular rhythm.      No gallop.   Pulses:     Intact distal pulses.   Edema:     Peripheral edema absent.   Abdominal:      General: There is no distension.      Palpations: Abdomen is soft.      Tenderness: There is no abdominal tenderness.   Musculoskeletal: Normal range of motion.         General: No tenderness or deformity.      Cervical back: Normal " range of motion and neck supple. Skin:     General: Skin is warm and dry.      Findings: No erythema or rash.   Neurological:      Mental Status: Alert and oriented to person, place, and time.      Coordination: Coordination normal.   Psychiatric:         Attention and Perception: Attention normal.         Mood and Affect: Mood normal.         Speech: Speech normal.         Behavior: Behavior normal. Behavior is cooperative.         Thought Content: Thought content normal.         Cognition and Memory: Cognition normal.         Judgment: Judgment normal.               ECG 12 Lead    Date/Time: 5/3/2022 9:28 AM  Performed by: Maia Carlisle APRN  Authorized by: Maia Carlisle APRN   Comparison: compared with previous ECG from 3/1/2021  Similar to previous ECG  Rhythm: sinus rhythm  Ectopy: bigeminy  Rate: normal  Conduction: conduction normal  Q waves: V1, V2 and V3    ST Segments: ST segments normal  T flattening: V3  QRS axis: normal    Clinical impression: abnormal EKG              Assessment:       Diagnosis Plan   1. Coronary artery disease involving coronary bypass graft of native heart without angina pectoris     2. Cardiomyopathy, ischemic     3. Primary hypertension     4. PAD (peripheral artery disease) ([facility])     5. Asymptomatic carotid artery stenosis without infarction, unspecified laterality            Plan:       1. PAD, sees Dr. Pinto, s/p right femoral endartectomy and stents in the right iliac.    2. CAD. Occluded RCA with collaterals - denies angina  3. Tobacco use. - cessation advised    4. HTN, on coreg, off benazepril due to cough, not on losartan due to cost.  She does not check her BP at home.  I did recheck it and it was better at 140/84.  She is not interested in starting another medication because she feels poorly if she does.  5. Moderate right internal carotid artery stenosis with mild left internal carotid artery  stenosis. Bruit appreciated  6. HLD - not treated     RTO in 1 year  with RM    As always, it has been a pleasure to participate in your patient's care. Thank you.       Sincerely,       GUILLERMINA Miller      Current Outpatient Medications:   •  aspirin 81 MG tablet, Take by mouth daily., Disp: , Rfl:   •  carvedilol (COREG) 3.125 MG tablet, TAKE 1 TABLET BY MOUTH TWICE DAILY WITH MEALS, Disp: 180 tablet, Rfl: 0  •  Cholecalciferol (VITAMIN D3) 2000 UNITS tablet, Take by mouth., Disp: , Rfl:   •  esomeprazole (NexIUM) 40 MG capsule, Take by mouth daily., Disp: , Rfl:   •  nicotine polacrilex (Nicorette) 4 MG gum, Chew 1 each As Needed for Smoking Cessation., Disp: 90 each, Rfl: 3  •  vitamin B-12 (CYANOCOBALAMIN) 2500 MCG sublingual tablet tablet, Place under the tongue daily., Disp: , Rfl:   •  vitamin B-6 (PYRIDOXINE) 100 MG tablet, Take 100 mg by mouth daily., Disp: , Rfl:       Dictated utilizing Dragon dictation

## 2022-08-02 NOTE — TELEPHONE ENCOUNTER
Rx Refill Note  Requested Prescriptions     Pending Prescriptions Disp Refills   • carvedilol (COREG) 3.125 MG tablet [Pharmacy Med Name: Carvedilol 3.125 MG Oral Tablet] 180 tablet 0     Sig: TAKE 1 TABLET BY MOUTH TWICE DAILY WITH MEALS      Last office visit with prescribing clinician: 5/3/2022      Next office visit with prescribing clinician: 5/8/2023 WENDY Cuevas MA  08/02/22, 10:06 EDT

## 2022-08-03 RX ORDER — CARVEDILOL 3.12 MG/1
TABLET ORAL
Qty: 180 TABLET | Refills: 3 | Status: SHIPPED | OUTPATIENT
Start: 2022-08-03

## 2022-09-26 ENCOUNTER — TELEPHONE (OUTPATIENT)
Dept: CARDIOLOGY | Facility: CLINIC | Age: 77
End: 2022-09-26

## 2022-09-30 ENCOUNTER — OFFICE VISIT (OUTPATIENT)
Dept: CARDIOLOGY | Facility: CLINIC | Age: 77
End: 2022-09-30

## 2022-09-30 ENCOUNTER — LAB (OUTPATIENT)
Dept: LAB | Facility: HOSPITAL | Age: 77
End: 2022-09-30

## 2022-09-30 VITALS
OXYGEN SATURATION: 94 % | DIASTOLIC BLOOD PRESSURE: 90 MMHG | WEIGHT: 128 LBS | HEIGHT: 66 IN | HEART RATE: 53 BPM | RESPIRATION RATE: 16 BRPM | SYSTOLIC BLOOD PRESSURE: 190 MMHG | BODY MASS INDEX: 20.57 KG/M2

## 2022-09-30 DIAGNOSIS — R07.2 PRECORDIAL PAIN: ICD-10-CM

## 2022-09-30 DIAGNOSIS — R06.09 DYSPNEA ON EXERTION: ICD-10-CM

## 2022-09-30 DIAGNOSIS — I25.810 CORONARY ARTERY DISEASE INVOLVING CORONARY BYPASS GRAFT OF NATIVE HEART WITHOUT ANGINA PECTORIS: ICD-10-CM

## 2022-09-30 DIAGNOSIS — I73.9 PAD (PERIPHERAL ARTERY DISEASE): ICD-10-CM

## 2022-09-30 DIAGNOSIS — I25.5 CARDIOMYOPATHY, ISCHEMIC: ICD-10-CM

## 2022-09-30 DIAGNOSIS — Z72.0 TOBACCO USE: ICD-10-CM

## 2022-09-30 DIAGNOSIS — I25.810 CORONARY ARTERY DISEASE INVOLVING CORONARY BYPASS GRAFT OF NATIVE HEART WITHOUT ANGINA PECTORIS: Primary | ICD-10-CM

## 2022-09-30 DIAGNOSIS — I10 PRIMARY HYPERTENSION: ICD-10-CM

## 2022-09-30 DIAGNOSIS — R00.2 PALPITATIONS: ICD-10-CM

## 2022-09-30 LAB
ALBUMIN SERPL-MCNC: 4.7 G/DL (ref 3.5–5.2)
ALBUMIN/GLOB SERPL: 1.9 G/DL
ALP SERPL-CCNC: 73 U/L (ref 39–117)
ALT SERPL W P-5'-P-CCNC: 14 U/L (ref 1–33)
ANION GAP SERPL CALCULATED.3IONS-SCNC: 12.2 MMOL/L (ref 5–15)
AST SERPL-CCNC: 22 U/L (ref 1–32)
BASOPHILS # BLD AUTO: 0.04 10*3/MM3 (ref 0–0.2)
BASOPHILS NFR BLD AUTO: 0.6 % (ref 0–1.5)
BILIRUB SERPL-MCNC: 0.4 MG/DL (ref 0–1.2)
BUN SERPL-MCNC: 9 MG/DL (ref 8–23)
BUN/CREAT SERPL: 11.5 (ref 7–25)
CALCIUM SPEC-SCNC: 9.5 MG/DL (ref 8.6–10.5)
CHLORIDE SERPL-SCNC: 96 MMOL/L (ref 98–107)
CHOLEST SERPL-MCNC: 273 MG/DL (ref 0–200)
CO2 SERPL-SCNC: 23.8 MMOL/L (ref 22–29)
CREAT SERPL-MCNC: 0.78 MG/DL (ref 0.57–1)
DEPRECATED RDW RBC AUTO: 40 FL (ref 37–54)
EGFRCR SERPLBLD CKD-EPI 2021: 79.3 ML/MIN/1.73
EOSINOPHIL # BLD AUTO: 0.14 10*3/MM3 (ref 0–0.4)
EOSINOPHIL NFR BLD AUTO: 1.9 % (ref 0.3–6.2)
ERYTHROCYTE [DISTWIDTH] IN BLOOD BY AUTOMATED COUNT: 12.5 % (ref 12.3–15.4)
GLOBULIN UR ELPH-MCNC: 2.5 GM/DL
GLUCOSE SERPL-MCNC: 86 MG/DL (ref 65–99)
HCT VFR BLD AUTO: 38.1 % (ref 34–46.6)
HDLC SERPL-MCNC: 67 MG/DL (ref 40–60)
HGB BLD-MCNC: 13 G/DL (ref 12–15.9)
IMM GRANULOCYTES # BLD AUTO: 0.03 10*3/MM3 (ref 0–0.05)
IMM GRANULOCYTES NFR BLD AUTO: 0.4 % (ref 0–0.5)
LDLC SERPL CALC-MCNC: 191 MG/DL (ref 0–100)
LDLC/HDLC SERPL: 2.81 {RATIO}
LYMPHOCYTES # BLD AUTO: 2.34 10*3/MM3 (ref 0.7–3.1)
LYMPHOCYTES NFR BLD AUTO: 32.2 % (ref 19.6–45.3)
MAGNESIUM SERPL-MCNC: 2.2 MG/DL (ref 1.6–2.4)
MCH RBC QN AUTO: 29.6 PG (ref 26.6–33)
MCHC RBC AUTO-ENTMCNC: 34.1 G/DL (ref 31.5–35.7)
MCV RBC AUTO: 86.8 FL (ref 79–97)
MONOCYTES # BLD AUTO: 0.49 10*3/MM3 (ref 0.1–0.9)
MONOCYTES NFR BLD AUTO: 6.7 % (ref 5–12)
NEUTROPHILS NFR BLD AUTO: 4.23 10*3/MM3 (ref 1.7–7)
NEUTROPHILS NFR BLD AUTO: 58.2 % (ref 42.7–76)
NRBC BLD AUTO-RTO: 0 /100 WBC (ref 0–0.2)
NT-PROBNP SERPL-MCNC: 580 PG/ML (ref 0–1800)
PLATELET # BLD AUTO: 202 10*3/MM3 (ref 140–450)
PMV BLD AUTO: 9.4 FL (ref 6–12)
POTASSIUM SERPL-SCNC: 4.1 MMOL/L (ref 3.5–5.2)
PROT SERPL-MCNC: 7.2 G/DL (ref 6–8.5)
RBC # BLD AUTO: 4.39 10*6/MM3 (ref 3.77–5.28)
SODIUM SERPL-SCNC: 132 MMOL/L (ref 136–145)
TRIGL SERPL-MCNC: 90 MG/DL (ref 0–150)
TROPONIN T SERPL-MCNC: <0.01 NG/ML (ref 0–0.03)
TSH SERPL DL<=0.05 MIU/L-ACNC: 1.63 UIU/ML (ref 0.27–4.2)
VLDLC SERPL-MCNC: 15 MG/DL (ref 5–40)
WBC NRBC COR # BLD: 7.27 10*3/MM3 (ref 3.4–10.8)

## 2022-09-30 PROCEDURE — 36415 COLL VENOUS BLD VENIPUNCTURE: CPT

## 2022-09-30 PROCEDURE — 84484 ASSAY OF TROPONIN QUANT: CPT

## 2022-09-30 PROCEDURE — 80061 LIPID PANEL: CPT

## 2022-09-30 PROCEDURE — 83735 ASSAY OF MAGNESIUM: CPT

## 2022-09-30 PROCEDURE — 80053 COMPREHEN METABOLIC PANEL: CPT

## 2022-09-30 PROCEDURE — 93000 ELECTROCARDIOGRAM COMPLETE: CPT | Performed by: NURSE PRACTITIONER

## 2022-09-30 PROCEDURE — 84443 ASSAY THYROID STIM HORMONE: CPT

## 2022-09-30 PROCEDURE — 83880 ASSAY OF NATRIURETIC PEPTIDE: CPT

## 2022-09-30 PROCEDURE — 85025 COMPLETE CBC W/AUTO DIFF WBC: CPT

## 2022-09-30 PROCEDURE — 99214 OFFICE O/P EST MOD 30 MIN: CPT | Performed by: NURSE PRACTITIONER

## 2022-09-30 RX ORDER — AMLODIPINE BESYLATE 10 MG/1
5 TABLET ORAL DAILY
Qty: 30 TABLET | Refills: 5 | Status: SHIPPED | OUTPATIENT
Start: 2022-09-30 | End: 2022-11-04 | Stop reason: SDUPTHER

## 2022-10-17 ENCOUNTER — HOSPITAL ENCOUNTER (OUTPATIENT)
Dept: NUCLEAR MEDICINE | Facility: HOSPITAL | Age: 77
Discharge: HOME | End: 2022-10-17

## 2022-10-17 ENCOUNTER — HOSPITAL ENCOUNTER (OUTPATIENT)
Dept: CARDIOLOGY | Facility: HOSPITAL | Age: 77
Discharge: HOME | End: 2022-10-17

## 2022-10-17 VITALS
HEART RATE: 55 BPM | SYSTOLIC BLOOD PRESSURE: 125 MMHG | WEIGHT: 128 LBS | HEIGHT: 66 IN | DIASTOLIC BLOOD PRESSURE: 61 MMHG | BODY MASS INDEX: 20.57 KG/M2

## 2022-10-17 DIAGNOSIS — I25.810 CORONARY ARTERY DISEASE INVOLVING CORONARY BYPASS GRAFT OF NATIVE HEART WITHOUT ANGINA PECTORIS: ICD-10-CM

## 2022-10-17 DIAGNOSIS — R06.09 DYSPNEA ON EXERTION: ICD-10-CM

## 2022-10-17 DIAGNOSIS — R07.2 PRECORDIAL PAIN: ICD-10-CM

## 2022-10-17 DIAGNOSIS — I25.5 CARDIOMYOPATHY, ISCHEMIC: ICD-10-CM

## 2022-10-17 DIAGNOSIS — I73.9 PAD (PERIPHERAL ARTERY DISEASE): ICD-10-CM

## 2022-10-17 DIAGNOSIS — R00.2 PALPITATIONS: ICD-10-CM

## 2022-10-17 DIAGNOSIS — Z72.0 TOBACCO USE: ICD-10-CM

## 2022-10-17 LAB
BH CV ECHO LEFT VENTRICLE GLOBAL LONGITUDINAL STRAIN: -11.6 %
BH CV ECHO MEAS - ACS: 1.8 CM
BH CV ECHO MEAS - AO MAX PG: 5.8 MMHG
BH CV ECHO MEAS - AO MEAN PG: 3 MMHG
BH CV ECHO MEAS - AO ROOT DIAM: 2.8 CM
BH CV ECHO MEAS - AO V2 MAX: 120 CM/SEC
BH CV ECHO MEAS - AO V2 VTI: 27.9 CM
BH CV ECHO MEAS - AVA(I,D): 1.91 CM2
BH CV ECHO MEAS - EDV(CUBED): 125 ML
BH CV ECHO MEAS - EDV(MOD-SP2): 65 ML
BH CV ECHO MEAS - EDV(MOD-SP4): 58 ML
BH CV ECHO MEAS - EF(MOD-BP): 46.6 %
BH CV ECHO MEAS - EF(MOD-SP2): 47.7 %
BH CV ECHO MEAS - EF(MOD-SP4): 48.3 %
BH CV ECHO MEAS - ESV(CUBED): 29.6 ML
BH CV ECHO MEAS - ESV(MOD-SP2): 34 ML
BH CV ECHO MEAS - ESV(MOD-SP4): 30 ML
BH CV ECHO MEAS - FS: 38.1 %
BH CV ECHO MEAS - IVS/LVPW: 0.83 CM
BH CV ECHO MEAS - IVSD: 1 CM
BH CV ECHO MEAS - LA DIMENSION: 3.5 CM
BH CV ECHO MEAS - LAT PEAK E' VEL: 8.3 CM/SEC
BH CV ECHO MEAS - LV DIASTOLIC VOL/BSA (35-75): 35.1 CM2
BH CV ECHO MEAS - LV MASS(C)D: 207.1 GRAMS
BH CV ECHO MEAS - LV MAX PG: 3.3 MMHG
BH CV ECHO MEAS - LV MEAN PG: 2 MMHG
BH CV ECHO MEAS - LV SYSTOLIC VOL/BSA (12-30): 18.1 CM2
BH CV ECHO MEAS - LV V1 MAX: 90.7 CM/SEC
BH CV ECHO MEAS - LV V1 VTI: 20.6 CM
BH CV ECHO MEAS - LVIDD: 5 CM
BH CV ECHO MEAS - LVIDS: 3.1 CM
BH CV ECHO MEAS - LVOT AREA: 2.6 CM2
BH CV ECHO MEAS - LVOT DIAM: 1.81 CM
BH CV ECHO MEAS - LVPWD: 1.2 CM
BH CV ECHO MEAS - MED PEAK E' VEL: 6.2 CM/SEC
BH CV ECHO MEAS - MV A MAX VEL: 70.7 CM/SEC
BH CV ECHO MEAS - MV DEC SLOPE: 287.9 CM/SEC2
BH CV ECHO MEAS - MV DEC TIME: 285 MSEC
BH CV ECHO MEAS - MV E MAX VEL: 54 CM/SEC
BH CV ECHO MEAS - MV E/A: 0.76
BH CV ECHO MEAS - MV MAX PG: 3 MMHG
BH CV ECHO MEAS - MV MEAN PG: 1.15 MMHG
BH CV ECHO MEAS - MV P1/2T: 80.4 MSEC
BH CV ECHO MEAS - MV V2 VTI: 24.2 CM
BH CV ECHO MEAS - MVA(P1/2T): 2.7 CM2
BH CV ECHO MEAS - MVA(VTI): 2.2 CM2
BH CV ECHO MEAS - PA ACC TIME: 0.13 SEC
BH CV ECHO MEAS - PA PR(ACCEL): 18.8 MMHG
BH CV ECHO MEAS - PA V2 MAX: 85 CM/SEC
BH CV ECHO MEAS - PI END-D VEL: 85 CM/SEC
BH CV ECHO MEAS - QP/QS: 1.35
BH CV ECHO MEAS - RV MAX PG: 1.74 MMHG
BH CV ECHO MEAS - RV V1 MAX: 66 CM/SEC
BH CV ECHO MEAS - RV V1 VTI: 18.4 CM
BH CV ECHO MEAS - RVOT DIAM: 2.23 CM
BH CV ECHO MEAS - SI(MOD-SP2): 18.7 ML/M2
BH CV ECHO MEAS - SI(MOD-SP4): 16.9 ML/M2
BH CV ECHO MEAS - SV(LVOT): 53.2 ML
BH CV ECHO MEAS - SV(MOD-SP2): 31 ML
BH CV ECHO MEAS - SV(MOD-SP4): 28 ML
BH CV ECHO MEAS - SV(RVOT): 71.8 ML
BH CV ECHO MEAS - TAPSE (>1.6): 1.19 CM
BH CV ECHO MEASUREMENTS AVERAGE E/E' RATIO: 7.45
BH CV REST NUCLEAR ISOTOPE DOSE: 12 MCI
BH CV STRESS BP STAGE 1: NORMAL
BH CV STRESS COMMENTS STAGE 1: NORMAL
BH CV STRESS DOSE REGADENOSON STAGE 1: 0.4
BH CV STRESS DURATION MIN STAGE 1: 0
BH CV STRESS DURATION SEC STAGE 1: 30
BH CV STRESS HR STAGE 1: 59
BH CV STRESS NUCLEAR ISOTOPE DOSE: 35 MCI
BH CV STRESS O2 STAGE 1: 98
BH CV STRESS PROTOCOL 1: NORMAL
BH CV STRESS RECOVERY BP: NORMAL MMHG
BH CV STRESS RECOVERY HR: 81 BPM
BH CV STRESS RECOVERY O2: 99 %
BH CV STRESS STAGE 1: 1
BH CV XLRA - RV BASE: 2.11 CM
BH CV XLRA - RV LENGTH: 5.5 CM
BH CV XLRA - RV MID: 1.65 CM
BH CV XLRA - TDI S': 8.5 CM/SEC
LEFT ATRIUM VOLUME INDEX: 13.5 ML/M2
LV EF 2D ECHO EST: 45 %
LV EF NUC BP: 42 %
MAXIMAL PREDICTED HEART RATE: 145 BPM
MAXIMAL PREDICTED HEART RATE: 145 BPM
PERCENT MAX PREDICTED HR: 66.21 %
SINUS: 2.8 CM
STJ: 2.27 CM
STRESS BASELINE BP: NORMAL MMHG
STRESS BASELINE HR: 55 BPM
STRESS O2 SAT REST: 99 %
STRESS PERCENT HR: 78 %
STRESS POST ESTIMATED WORKLOAD: 1 METS
STRESS POST EXERCISE DUR SEC: 30 SEC
STRESS POST O2 SAT PEAK: 100 %
STRESS POST PEAK BP: NORMAL MMHG
STRESS POST PEAK HR: 96 BPM
STRESS TARGET HR: 123 BPM
STRESS TARGET HR: 123 BPM

## 2022-10-17 PROCEDURE — 93306 TTE W/DOPPLER COMPLETE: CPT | Performed by: INTERNAL MEDICINE

## 2022-10-17 PROCEDURE — 93356 MYOCRD STRAIN IMG SPCKL TRCK: CPT | Performed by: INTERNAL MEDICINE

## 2022-10-17 PROCEDURE — 0 TECHNETIUM SESTAMIBI: Performed by: NURSE PRACTITIONER

## 2022-10-17 PROCEDURE — 93306 TTE W/DOPPLER COMPLETE: CPT

## 2022-10-17 PROCEDURE — 93017 CV STRESS TEST TRACING ONLY: CPT

## 2022-10-17 PROCEDURE — A9500 TC99M SESTAMIBI: HCPCS | Performed by: NURSE PRACTITIONER

## 2022-10-17 PROCEDURE — 78452 HT MUSCLE IMAGE SPECT MULT: CPT | Performed by: INTERNAL MEDICINE

## 2022-10-17 PROCEDURE — 93018 CV STRESS TEST I&R ONLY: CPT | Performed by: INTERNAL MEDICINE

## 2022-10-17 PROCEDURE — 93016 CV STRESS TEST SUPVJ ONLY: CPT | Performed by: INTERNAL MEDICINE

## 2022-10-17 PROCEDURE — 25010000002 REGADENOSON 0.4 MG/5ML SOLUTION: Performed by: NURSE PRACTITIONER

## 2022-10-17 PROCEDURE — 78452 HT MUSCLE IMAGE SPECT MULT: CPT

## 2022-10-17 PROCEDURE — 93356 MYOCRD STRAIN IMG SPCKL TRCK: CPT

## 2022-10-17 RX ADMIN — TECHNETIUM TC 99M SESTAMIBI 1 DOSE: 1 INJECTION INTRAVENOUS at 07:01

## 2022-10-17 RX ADMIN — TECHNETIUM TC 99M SESTAMIBI 1 DOSE: 1 INJECTION INTRAVENOUS at 08:28

## 2022-10-17 RX ADMIN — REGADENOSON 0.4 MG: 0.08 INJECTION, SOLUTION INTRAVENOUS at 08:28

## 2022-10-18 NOTE — PROGRESS NOTES
I spoke with her. BP better and chest pain better with the addition of 5 mg amlodipine. Will continue to treat medically. Echo is stable. TMM

## 2022-11-04 ENCOUNTER — OFFICE VISIT (OUTPATIENT)
Dept: CARDIOLOGY | Facility: CLINIC | Age: 77
End: 2022-11-04

## 2022-11-04 VITALS
DIASTOLIC BLOOD PRESSURE: 76 MMHG | BODY MASS INDEX: 20.57 KG/M2 | WEIGHT: 128 LBS | SYSTOLIC BLOOD PRESSURE: 120 MMHG | HEIGHT: 66 IN | HEART RATE: 64 BPM

## 2022-11-04 DIAGNOSIS — I73.9 PAD (PERIPHERAL ARTERY DISEASE): ICD-10-CM

## 2022-11-04 DIAGNOSIS — I25.5 CARDIOMYOPATHY, ISCHEMIC: ICD-10-CM

## 2022-11-04 DIAGNOSIS — R00.2 PALPITATIONS: ICD-10-CM

## 2022-11-04 DIAGNOSIS — R07.2 PRECORDIAL PAIN: ICD-10-CM

## 2022-11-04 DIAGNOSIS — Z72.0 TOBACCO USE: ICD-10-CM

## 2022-11-04 DIAGNOSIS — R06.09 DYSPNEA ON EXERTION: ICD-10-CM

## 2022-11-04 DIAGNOSIS — I25.810 CORONARY ARTERY DISEASE INVOLVING CORONARY BYPASS GRAFT OF NATIVE HEART WITHOUT ANGINA PECTORIS: Primary | ICD-10-CM

## 2022-11-04 DIAGNOSIS — I10 PRIMARY HYPERTENSION: ICD-10-CM

## 2022-11-04 PROCEDURE — 99214 OFFICE O/P EST MOD 30 MIN: CPT | Performed by: NURSE PRACTITIONER

## 2022-11-04 PROCEDURE — 93000 ELECTROCARDIOGRAM COMPLETE: CPT | Performed by: NURSE PRACTITIONER

## 2022-11-04 RX ORDER — AMLODIPINE BESYLATE 5 MG/1
5 TABLET ORAL DAILY
Qty: 45 TABLET | Refills: 7 | Status: SHIPPED | OUTPATIENT
Start: 2022-11-04

## 2022-11-04 NOTE — PROGRESS NOTES
[facility] CARDIOLOGY  [address]  [address]  [place] [address]  Phone: [phone]  Fax: [phone]      Patient Name: Marlin Pedroza  :1945  Age: 77 y.o.  Primary Cardiologist: Olamide Whaley MD  Encounter Provider:  GUILLERMINA Crowe      Chief Complaint     Chief Complaint: No chief complaint on file.  Chest pain    SUBJECTIVE     History of Present Illness:  Marlin Pedroza is a 77 y.o.  female whose medical history includes vascular disease, Raynaud's disease, and current smoker.  She is followed in our office by Dr. Whaley for coronary artery disease.     22 Follow-up:  She is here for follow-up of testing.  Her stress test showed old infarct but no new ischemia.  Her echocardiogram was stable.  She is tolerating 5 mg amlodipine, but she switched it to nightly and she is having less fatigue with it.  Her blood pressure is better controlled.  She is having no chest pain.  Her main complaint is leg and back pain which she attributes to her peripheral vascular disease.  Her breathing is comfortable and she is having no palpitations.  She states she has trouble affording medications as she does not have health insurance.  She continues to smoke about half pack to 1 pack cigarettes per day.  She smokes menthol's and has no plans to quit, though she is concerned about the rumors that they may discontinue menthol cigarettes.    Below is a summary of pertinent cardiology findings:  • Vascular disease (she follows with Dr. Clayton): Severe disease of the right external iliac, right common iliac, occlusion of the right superficial femoral artery with single-vessel runoff to the right posterior tibial artery, severe disease on the left side but patent left superficial femoral artery to the left.  Known carotid artery disease with 10 to 49% left internal carotid artery stenosis and 50 to 60% right internal carotid artery stenosis.  2013 right  common and deep femoral endarterectomy, and right common and external iliac stent placed.  • August 2019 carotid artery Dopplers showed 60 to 70% right internal carotid artery stenosis and 50 to 60% left internal carotid artery stenosis.  LAYNE in the right was 0.69 and on the left 0.95.  • August 2020 vascular assessment showed patent abdominal aorta, patent right common and external iliac artery stents, widely patent common femoral artery on the right.  Left common and external iliac arteries had extensive calcification with mild stenosis which was stable.  ABIs showed moderate occlusive disease bilaterally which was stable.  Carotid Doppler showed 60 to 70% right internal carotid artery stenosis and 60 to 70% left internal carotid artery stenosis which is stable.  • Coronary artery disease: October 2013 echocardiogram for  Q waves and T wave inversions in the inferior leads showed EF 42%, mild LV dilation, inferior wall and septal wall hypokinesis, moderate mitral insufficiency, mild tricuspid insufficiency, and grade 1 diastolic dysfunction.  • October 2013 stress PET study showed a small area of infarct in inferior wall with a large amount of maryam-infarct ischemia.  • October 2013 coronary angiography showed EF 50% with inferobasilar akinesis and mild inferior wall hypokinesis, the LAD had sequential proximal 40% stenosis and 50 to 60% mid vessel stenosis, first diagonal branch was patent, left circumflex was patent, obtuse marginal branch had 20% proximal stenosis, the RCA was 100% proximally occluded and fills briskly by ipsilateral and contralateral collaterals; she was treated medically.  • October 2022 myocardial perfusion stress study showed a medium sized infarct located in the inferior wall with no significant ischemia and EF 42%.  She was treated with the addition of amlodipine.  • October 2022 echocardiogram showed EF 45%, mid inferior and mid inferoseptal hypokinesis, akinesis of the basal inferior and  "basal inferoseptal wall, normal LV diastolic function, normal RVSP, and GLS -11.6%.    Past Medical History:   Diagnosis Date   • CAD (coronary artery disease)    • Carotid artery stenosis    • Hypertension    • Intermittent claudication (HCC)    • Ischemic cardiomyopathy    • PAD (peripheral artery disease) (HCC)    • Stenosis of iliac artery (HCC)        Past Surgical History:  • Hernia repair  • Hysterectomy    Social History     Socioeconomic History   • Marital status:    Tobacco Use   • Smoking status: Every Day     Packs/day: 0.50     Types: Cigarettes   • Smokeless tobacco: Never   Substance and Sexual Activity   • Alcohol use: No     Comment: Caffeine use: 2-4 cups daily   • Drug use: Never     Comment: CAFFEINE USE         Review of Systems     Review of Systems   Cardiovascular: Positive for dyspnea on exertion. Negative for chest pain, irregular heartbeat, leg swelling, near-syncope, orthopnea, palpitations, paroxysmal nocturnal dyspnea and syncope.   Musculoskeletal: Positive for back pain.       Medications     Allergies as of 11/04/2022 - Reviewed 11/04/2022   Allergen Reaction Noted   • Morphine and related  02/22/2016   • Penicillins Rash 07/20/2013       Current Outpatient Medications on File Prior to Visit   Medication Sig   • aspirin 81 MG tablet Take by mouth daily.   • carvedilol (COREG) 3.125 MG tablet TAKE 1 TABLET BY MOUTH TWICE DAILY WITH MEALS   • Cholecalciferol (VITAMIN D3) 2000 UNITS tablet Take by mouth.   • esomeprazole (NexIUM) 40 MG capsule Take 1 capsule by mouth Daily.   • vitamin B-12 (CYANOCOBALAMIN) 2500 MCG sublingual tablet tablet Place under the tongue daily.   • vitamin B-6 (PYRIDOXINE) 100 MG tablet Take 100 mg by mouth daily.   • [DISCONTINUED] amLODIPine (NORVASC) 10 MG tablet Take 0.5 tablets by mouth Daily.     No current facility-administered medications on file prior to visit.          OBJECTIVE     Vital Signs:   /76   Pulse 64   Ht 167.6 cm (66\")   " Wt 58.1 kg (128 lb)   BMI 20.66 kg/m²       Weight:  Wt Readings from Last 3 Encounters:   11/04/22 58.1 kg (128 lb)   10/17/22 58.1 kg (128 lb)   09/30/22 58.1 kg (128 lb)     Body mass index is 20.66 kg/m².      Physical Exam     Physical Exam  Constitutional:       General: She is not in acute distress.  HENT:      Head: Normocephalic and atraumatic.      Mouth/Throat:      Mouth: Mucous membranes are moist.   Eyes:      General: No scleral icterus.     Extraocular Movements: Extraocular movements intact.      Conjunctiva/sclera: Conjunctivae normal.      Pupils: Pupils are equal, round, and reactive to light.   Cardiovascular:      Rate and Rhythm: Normal rate and regular rhythm.      Pulses: Normal pulses.      Heart sounds: S1 normal and S2 normal.   Pulmonary:      Effort: No respiratory distress.      Breath sounds: Normal breath sounds. No wheezing, rhonchi or rales.   Abdominal:      General: Bowel sounds are normal. There is no distension.      Palpations: Abdomen is soft.      Tenderness: There is no abdominal tenderness.   Musculoskeletal:         General: Normal range of motion.      Cervical back: Normal range of motion and neck supple.      Right lower leg: No edema.      Left lower leg: No edema.   Skin:     General: Skin is warm and dry.      Coloration: Skin is not jaundiced.   Neurological:      Mental Status: She is alert and oriented to person, place, and time.   Psychiatric:         Mood and Affect: Mood normal.         Reviewed Data     Result Review :  The following data was reviewed by GUILLERMINA Crowe on 11/04/22:  • Labs 02/25/2020:  cr 0.8, K 4.3, otherwise unremarkable CMP, Hgb 13.2, Plt 239  • Labs 09/30/2022: cr 0.8, K 4.1, , otherwise unremarkable CMP, Hgb 13.0, , Chol 273, HDL 67, , Trig 90, TSH 1.630, proBNP 580, troponin less than 0.010      ECG 12 Lead    Date/Time: 11/4/2022 9:36 AM  Performed by: Princess Kulkarni APRN  Authorized  by: Princess Kulkarni APRN   Comparison: compared with previous ECG from 9/30/2022  Similar to previous ECG  Rhythm: sinus rhythm  Rate: normal  BPM: 64  Q waves: V2 and V3    ST Depression: V6  T inversion: II, III and aVF    Clinical impression: abnormal EKG            Assessment and Plan        Assessment and Plan     Assessment:  1. Coronary artery disease involving coronary bypass graft of native heart without angina pectoris    2. Cardiomyopathy, ischemic    3. Primary hypertension    4. PAD (peripheral artery disease) (HCC)    5. Tobacco use    6. Precordial pain    7. Dyspnea on exertion    8. Palpitations         1. Coronary artery disease: EKG in October 2013 showed inferior Q waves and T wave inversions.  She had an abnormal stress PET study and coronary angiography showed nonobstructive disease in the LAD and obtuse marginal branch.  There was 100% proximal occlusion of the RCA with ipsilateral and contralateral collaterals; she was treated medically.  October 2022 myocardial perfusion stress study showed old infarct in the inferior wall but no new ischemia.  Her medical therapy includes carvedilol, amlodipine, and aspirin.  2. Ischemic cardiomyopathy: EF in October 2013 showed echocardiogram 42% and grade 1 diastolic dysfunction.  October 2022 echocardiogram showed EF 45% and inferior wall motion abnormalities.  Her medical therapy includes carvedilol.  She has been reluctant to try other medications due to their cost.  3. Hypertension: Better controlled on 5 mg amlodipine nightly.  4. Peripheral artery disease: She has a significant history of peripheral artery disease and follows with Dr. Clayton.  In November 2013 she had right common and deep femoral endarterectomy, and right common and external iliac stents placed.  Most recent vascular assessments showed right leg stents to be patent but left common and external iliac arteries was thick extensive calcification and mild stenosis; this was  stable.  Carotid artery Doppler showed 60 to 70% bilateral carotid artery stenosis which is stable.  ABIs showed moderate occlusive disease bilaterally which is stable.  5. Tobacco abuse: She continues to smoke.  6. Palpitations: These have resolved.    Plan:  1. I made no medication changes today.  I gave her a good Rx coupon to help with amlodipine.  2. She will keep her May 2023 appointment with Dr. Whaley.      Follow Up:  No follow-ups on file.  Orders Placed This Encounter   Procedures   • ECG 12 Lead      New Medications Ordered This Visit   Medications   • amLODIPine (NORVASC) 5 MG tablet     Sig: Take 1 tablet by mouth Daily.     Dispense:  45 tablet     Refill:  7         Thank you the opportunity to participate in this patient's care.    GUILLERMINA Fox    This office note has been dictated.

## 2022-11-29 ENCOUNTER — TELEPHONE (OUTPATIENT)
Dept: CARDIOLOGY | Facility: CLINIC | Age: 77
End: 2022-11-29

## 2022-11-29 NOTE — TELEPHONE ENCOUNTER
I spoke with Marlin Pedroza and updated pt on recommendations from provider.  Pt verbalized understanding and has no further questions at this time.  Pt told that you want her to resume her amlodipine and follow up with her PCP regarding this rash/hives to further work up the cause.    Thank you,    Sara Guerrier RN  Triage LC  11/29/22 09:05 EST

## 2022-11-29 NOTE — TELEPHONE ENCOUNTER
pls call and find out what the reaction is    rm    ----- Message from Leonel Mendoza sent at 11/28/2022 12:03 PM EST -----  Pt called in believes to have an allergic reaction towards amlodipine. Pt has not consulted with pcp on reaction yet. Please advise. Thanks

## 2022-11-29 NOTE — TELEPHONE ENCOUNTER
It is likely not the medication based on this history - allergy to something else since rash did not resolve with stopping the medication and started about 6 weeks after starting the amlodipine.  Would recommend that she restart it and see her pcp.  Pls call her

## 2022-11-29 NOTE — TELEPHONE ENCOUNTER
I called pt to discuss her allergic reaction.    Pt states she started her first dose of amlodipine on 10/1/22.  She developed hives/rash to her fingers, hands, wrist, neck, & back on 11/17/22.  Pt discontinued the amlodipine herself on 11/26/22 due to rash/hives, but pt reports it has not improved since she stopped medication.  Pt reports that she has not changed any soaps, lotions, detergent, etc recently.    Do you have any recommendations for this patient?    Thank you,    Sraa Guerrier RN  [facility]  11/29/22  08:47 EST

## 2023-05-08 ENCOUNTER — OFFICE VISIT (OUTPATIENT)
Dept: CARDIOLOGY | Facility: CLINIC | Age: 78
End: 2023-05-08
Payer: MEDICARE

## 2023-05-08 VITALS
WEIGHT: 126 LBS | RESPIRATION RATE: 16 BRPM | SYSTOLIC BLOOD PRESSURE: 180 MMHG | OXYGEN SATURATION: 97 % | DIASTOLIC BLOOD PRESSURE: 80 MMHG | BODY MASS INDEX: 20.25 KG/M2 | HEART RATE: 64 BPM | HEIGHT: 66 IN

## 2023-05-08 DIAGNOSIS — I73.9 PAD (PERIPHERAL ARTERY DISEASE): ICD-10-CM

## 2023-05-08 DIAGNOSIS — I25.5 CARDIOMYOPATHY, ISCHEMIC: Primary | ICD-10-CM

## 2023-05-08 DIAGNOSIS — R00.2 PALPITATIONS: ICD-10-CM

## 2023-05-08 DIAGNOSIS — R06.09 DYSPNEA ON EXERTION: ICD-10-CM

## 2023-05-08 DIAGNOSIS — R07.2 PRECORDIAL PAIN: ICD-10-CM

## 2023-05-08 DIAGNOSIS — I49.3 PVC'S (PREMATURE VENTRICULAR CONTRACTIONS): ICD-10-CM

## 2023-05-08 DIAGNOSIS — Z72.0 TOBACCO USE: ICD-10-CM

## 2023-05-08 DIAGNOSIS — I10 PRIMARY HYPERTENSION: ICD-10-CM

## 2023-05-08 DIAGNOSIS — I25.810 CORONARY ARTERY DISEASE INVOLVING CORONARY BYPASS GRAFT OF NATIVE HEART WITHOUT ANGINA PECTORIS: ICD-10-CM

## 2023-05-08 PROCEDURE — 1159F MED LIST DOCD IN RCRD: CPT | Performed by: INTERNAL MEDICINE

## 2023-05-08 PROCEDURE — 1160F RVW MEDS BY RX/DR IN RCRD: CPT | Performed by: INTERNAL MEDICINE

## 2023-05-08 PROCEDURE — 99214 OFFICE O/P EST MOD 30 MIN: CPT | Performed by: INTERNAL MEDICINE

## 2023-05-08 PROCEDURE — 93000 ELECTROCARDIOGRAM COMPLETE: CPT | Performed by: INTERNAL MEDICINE

## 2023-05-08 PROCEDURE — 3077F SYST BP >= 140 MM HG: CPT | Performed by: INTERNAL MEDICINE

## 2023-05-08 PROCEDURE — 3079F DIAST BP 80-89 MM HG: CPT | Performed by: INTERNAL MEDICINE

## 2023-05-08 RX ORDER — LOSARTAN POTASSIUM 25 MG/1
25 TABLET ORAL NIGHTLY
Qty: 90 TABLET | Refills: 3 | Status: SHIPPED | OUTPATIENT
Start: 2023-05-08

## 2023-05-08 RX ORDER — CARVEDILOL 3.12 MG/1
3.12 TABLET ORAL 2 TIMES DAILY WITH MEALS
Qty: 180 TABLET | Refills: 3 | Status: SHIPPED | OUTPATIENT
Start: 2023-05-08

## 2023-05-08 RX ORDER — AMLODIPINE BESYLATE 5 MG/1
5 TABLET ORAL NIGHTLY
Qty: 90 TABLET | Refills: 3 | Status: SHIPPED | OUTPATIENT
Start: 2023-05-08

## 2023-05-08 NOTE — PROGRESS NOTES
Date of Office Visit: 2023  Encounter Provider: Olamide Whaley MD  Place of Service: [facility] CARDIOLOGY  Patient Name: Marlin Pedroza  :1945      Patient ID:  Marlin Pedroza is a 77 y.o. female is here for  followup for CAD        History of Present Illness    She is known to have significant claudication of the right lower extremity and follows  with Dr. Pinto. She has severe disease involving the right external iliac, right  common iliac, occlusion of the right superficial femoral artery with single vessel runoff  to the right foot posterior tibial artery. She, on the left side, has severe disease, but  patent left superficial femoral artery on the left. She is also known to have carotid  artery disease and had a carotid artery duplex done 2013, showing 15-49% left  internal carotid artery stenosis and 50-60% right internal carotid artery stenosis.  Because of the amount of claudication she has in the right foot she is in need of some  sort of intervention for that leg, so Dr. Pinto is planning for 2013 for  stenting, possibly open procedure, to the right lower extremity.       When I saw her in the office, she had an abnormal electrocardiogram showing inferior  Q-waves, as well as T-wave inversion in the inferior leads. I was very concerned about  coronary artery disease, and she complained of some chest pain and some short-windedness.  Because of her symptoms that very day, on 10/03/2013, we set the patient up for an  echocardiogram which showed an ejection fraction of 42% and mild left ventricular   dilation, inferior wall and septal wall moderate hypokinesis. There was moderate mitral  insufficiency, mild tricuspid insufficiency and grade 1 diastolic dysfunction. She also  had a stress PET study done 10/10/03/2013, which showed a small area of infarct, in the  inferior wall there was a large amount of maryam-infarct ischemia. We took her to  the  cardiac catheterization lab, which was done then, 10/09/2013. This showed no heart  failure. Her ejection fraction was 50% with inferobasilar akinesis and mild inferior wall  hypokinesis. The left anterior descending had sequential proximal 40% stenosis and 50-60%  mid-vessel stenosis. The first diagonal branch was long and patent. The left circumflex  vessel was widely patent and the obtuse marginal branch, which was a moderate vessel, had  20% proximal stenosis. The right coronary artery was 100% occluded proximally and filled  briskly SOFIE-1 flow by ipsilateral and contralateral collaterals. At that time, I  recommended medical changes so that she could go ahead and get intervention of her leg  done.       She has pretty bad Raynaud's disease, and it has worsened since  being on Coreg. In addition, she says the atorvastatin causes her to cough and keeps her  from sleeping.       On 11/25/2013, she did have intervention to her right leg. She had right common and deep  femoral endarterectomy with Vascu-Guard patch. She also had a right common and an  external iliac stent placed.      She saw vascular 8/2019 and had carotid duplex showing 60-70% YONATHAN stenosis and 50-60% LICA stenosis.  Vertebral arteries had antegrade flow.  Her LAYNE on the right was 0.69 and on the left 0.95.  Her PAD exam done 8/1/2020 showing patent abdominal aorta, patent right common and external iliac arterial stents and widely patent common femoral artery on the right after endarterectomy.  The left common and external iliac arteries had extensive calcification with mild stenosis which was unchanged from prior exam.  Her LAYNE showed moderate occlusive disease bilaterally which was unchanged.  Her carotid study showed 60 to 70% right internal carotid artery stenosis and 60 to 70% left internal carotid artery stenosis which was really fairly stable the left had progressed slightly.  She has been following with Dr. Clayton.    Labs on  9/30/2022 show normal troponin, normal proBNP, glucose 86, sodium 132, otherwise normal CMP, normal TSH, total cholesterol 273, HDL 67, , triglycerides 90, normal CBC.  She had a stress nuclear perfusion study done 10/17/2022 showing a medium size infarct of the inferior wall with no ischemia.  Echo done 10/17/2022 showed ejection fraction 45% with hypokinesis of the mid inferior and inferoseptal segments and akinesis of the basilar inferior and basal inferoseptal segments, global longitudinal strain of -11.6% with normal diastolic function and no significant valvular abnormalities.    She has no exertional chest tightness or pressure.  She has no difficulty breathing.  She has chronic leg pain with activity.  She does not feel her heart racing or skipping.  She has had no dizziness, syncope or falls.  She had a cough with benazepril.  She stopped losartan due to cost.  He has no orthopnea or PND.  She takes her medications as directed without difficulty.  She sees [facility] usually every summer.  Recheck of her blood pressure here today I got 178/82.    Addendum: She had a follow-up visit with Tena Campbell 8/19/2022 at [facility].  Her LAYNE on her right was 0.65 in the left 0.75, stable.  Her carotid duplex study showed 50-60% stenosis bilaterally.  No changes were made at that time and they recommended that she come back in a year.  They did recommend smoking cessation.    Past Medical History:   Diagnosis Date   • CAD (coronary artery disease)    • Carotid artery stenosis    • Hypertension    • Intermittent claudication    • Ischemic cardiomyopathy    • PAD (peripheral artery disease)    • Stenosis of iliac artery          Past Surgical History:   Procedure Laterality Date   • CARDIAC CATHETERIZATION     • CORONARY ANGIOPLASTY  10/2013   • HERNIA REPAIR     • HYSTERECTOMY         Current Outpatient Medications on File Prior to Visit   Medication Sig Dispense Refill   •  "amLODIPine (NORVASC) 5 MG tablet Take 1 tablet by mouth Daily. 45 tablet 7   • aspirin 81 MG tablet Take by mouth daily.     • carvedilol (COREG) 3.125 MG tablet TAKE 1 TABLET BY MOUTH TWICE DAILY WITH MEALS 180 tablet 3   • Cholecalciferol (VITAMIN D3) 2000 UNITS tablet Take by mouth.     • esomeprazole (NexIUM) 40 MG capsule Take 1 capsule by mouth Daily.     • vitamin B-12 (CYANOCOBALAMIN) 2500 MCG sublingual tablet tablet Place under the tongue daily.     • vitamin B-6 (PYRIDOXINE) 100 MG tablet Take 1 tablet by mouth Daily.       No current facility-administered medications on file prior to visit.       Social History     Socioeconomic History   • Marital status:    Tobacco Use   • Smoking status: Every Day     Packs/day: 0.50     Types: Cigarettes   • Smokeless tobacco: Never   Substance and Sexual Activity   • Alcohol use: No     Comment: Caffeine use: 2-4 cups daily   • Drug use: Never     Comment: CAFFEINE USE           ROS    Procedures    ECG 12 Lead    Date/Time: 5/8/2023 9:48 AM  Performed by: Olamide Whaley MD  Authorized by: Olamide Whaley MD   Comparison: compared with previous ECG   Similar to previous ECG  Rhythm: sinus rhythm  Ectopy: unifocal PVCs  Q waves: III, aVF, V2, V3 and V1    T inversion: aVF    Clinical impression: abnormal EKG                Objective:      Vitals:    05/08/23 0939   BP: 180/80   Pulse: 64   Resp: 16   SpO2: 97%   Weight: 57.2 kg (126 lb)   Height: 167.6 cm (66\")     Body mass index is 20.34 kg/m².    Vitals reviewed.   Constitutional:       General: Not in acute distress.     Appearance: Well-developed. Not diaphoretic.   Eyes:      General: No scleral icterus.     Conjunctiva/sclera: Conjunctivae normal.   HENT:      Head: Normocephalic and atraumatic.   Neck:      Thyroid: No thyromegaly.      Vascular: No carotid bruit or JVD.      Lymphadenopathy: No cervical adenopathy.   Pulmonary:      Effort: Pulmonary effort is normal. No respiratory " distress.      Breath sounds: Normal breath sounds. No wheezing. No rhonchi. No rales.   Chest:      Chest wall: Not tender to palpatation.   Cardiovascular:      Normal rate. Regular rhythm.      Murmurs: There is no murmur.      No gallop.   Pulses:     Intact distal pulses.   Edema:     Peripheral edema absent.   Abdominal:      General: Bowel sounds are normal. There is no distension or abdominal bruit.      Palpations: Abdomen is soft. There is no abdominal mass.      Tenderness: There is no abdominal tenderness.   Musculoskeletal:         General: No deformity.      Extremities: No clubbing present.     Cervical back: Neck supple. Skin:     General: Skin is warm and dry. There is no cyanosis.      Coloration: Skin is not pale.      Findings: No rash.   Neurological:      Mental Status: Alert and oriented to person, place, and time.      Cranial Nerves: No cranial nerve deficit.   Psychiatric:         Judgment: Judgment normal.         Lab Review:       Assessment:      Diagnosis Plan   1. Cardiomyopathy, ischemic        2. Coronary artery disease involving coronary bypass graft of native heart without angina pectoris        3. PVC's (premature ventricular contractions)        4. PAD (peripheral artery disease)        5. Tobacco use        6. Primary hypertension            1. PAD, sees Dr. Pinto, s/p right femoral endartectomy and stents in the right iliac.    2. CAD. Occluded RCA with collaterals, no CHF or angina.   3. Ischemic cardiomyopathy, ejection fraction 45% with inferior wall akinesis.  4. Tobacco use. Needs to stop smoking, and she knows this and we discuss this at every visit.    5. HTN, on coreg, off benazepril due to cough, start losartan 25 mg nightly.  6. Moderate right internal carotid artery stenosis with mild left internal carotid artery stenosis.    7. HLD, not taking any statins-does not want to take statins.         Plan:       Start losartan 25 mg nightly in addition to amlodipine  and carvedilol.  She still does not want to take a statin.  See [place] in 6 months to recheck blood pressure and symptoms.  We will get records from [facility].  No other testing at this time.

## 2023-05-22 ENCOUNTER — HOSPITAL ENCOUNTER (EMERGENCY)
Facility: HOSPITAL | Age: 78
Discharge: HOME | End: 2023-05-22
Attending: EMERGENCY MEDICINE | Admitting: EMERGENCY MEDICINE
Payer: MEDICARE

## 2023-05-22 ENCOUNTER — APPOINTMENT (OUTPATIENT)
Dept: GENERAL RADIOLOGY | Facility: HOSPITAL | Age: 78
End: 2023-05-22
Payer: MEDICARE

## 2023-05-22 VITALS
RESPIRATION RATE: 22 BRPM | HEART RATE: 59 BPM | HEIGHT: 66 IN | SYSTOLIC BLOOD PRESSURE: 128 MMHG | OXYGEN SATURATION: 95 % | TEMPERATURE: 98.1 F | DIASTOLIC BLOOD PRESSURE: 69 MMHG | BODY MASS INDEX: 19.77 KG/M2 | WEIGHT: 123 LBS

## 2023-05-22 DIAGNOSIS — R07.9 CHEST PAIN, UNSPECIFIED TYPE: Primary | ICD-10-CM

## 2023-05-22 LAB
ALBUMIN SERPL-MCNC: 4.2 G/DL (ref 3.5–5.2)
ALBUMIN/GLOB SERPL: 1.3 G/DL
ALP SERPL-CCNC: 72 U/L (ref 39–117)
ALT SERPL W P-5'-P-CCNC: 18 U/L (ref 1–33)
ANION GAP SERPL CALCULATED.3IONS-SCNC: 12.8 MMOL/L (ref 5–15)
APTT PPP: 25.3 SECONDS (ref 24.3–38.1)
AST SERPL-CCNC: 19 U/L (ref 1–32)
BACTERIA UR QL AUTO: ABNORMAL /HPF
BASOPHILS # BLD AUTO: 0.02 10*3/MM3 (ref 0–0.2)
BASOPHILS NFR BLD AUTO: 0.4 % (ref 0–1.5)
BILIRUB SERPL-MCNC: 0.9 MG/DL (ref 0–1.2)
BILIRUB UR QL STRIP: NEGATIVE
BUN SERPL-MCNC: 16 MG/DL (ref 8–23)
BUN/CREAT SERPL: 21.3 (ref 7–25)
CALCIUM SPEC-SCNC: 9.3 MG/DL (ref 8.6–10.5)
CHLORIDE SERPL-SCNC: 100 MMOL/L (ref 98–107)
CLARITY UR: ABNORMAL
CO2 SERPL-SCNC: 23.2 MMOL/L (ref 22–29)
COLOR UR: YELLOW
CREAT SERPL-MCNC: 0.75 MG/DL (ref 0.57–1)
DEPRECATED RDW RBC AUTO: 41.1 FL (ref 37–54)
EGFRCR SERPLBLD CKD-EPI 2021: 83.1 ML/MIN/1.73
EOSINOPHIL # BLD AUTO: 0.04 10*3/MM3 (ref 0–0.4)
EOSINOPHIL NFR BLD AUTO: 0.7 % (ref 0.3–6.2)
ERYTHROCYTE [DISTWIDTH] IN BLOOD BY AUTOMATED COUNT: 13.2 % (ref 12.3–15.4)
GEN 5 2HR TROPONIN T REFLEX: 18 NG/L
GLOBULIN UR ELPH-MCNC: 3.2 GM/DL
GLUCOSE SERPL-MCNC: 97 MG/DL (ref 65–99)
GLUCOSE UR STRIP-MCNC: NEGATIVE MG/DL
HCT VFR BLD AUTO: 35.7 % (ref 34–46.6)
HGB BLD-MCNC: 12.6 G/DL (ref 12–15.9)
HGB UR QL STRIP.AUTO: ABNORMAL
HOLD SPECIMEN: NORMAL
HOLD SPECIMEN: NORMAL
HYALINE CASTS UR QL AUTO: ABNORMAL /LPF
IMM GRANULOCYTES # BLD AUTO: 0.01 10*3/MM3 (ref 0–0.05)
IMM GRANULOCYTES NFR BLD AUTO: 0.2 % (ref 0–0.5)
INR PPP: 1.08 (ref 0.9–1.1)
KETONES UR QL STRIP: ABNORMAL
LEUKOCYTE ESTERASE UR QL STRIP.AUTO: ABNORMAL
LIPASE SERPL-CCNC: 15 U/L (ref 13–60)
LYMPHOCYTES # BLD AUTO: 1.62 10*3/MM3 (ref 0.7–3.1)
LYMPHOCYTES NFR BLD AUTO: 29 % (ref 19.6–45.3)
MAGNESIUM SERPL-MCNC: 1.9 MG/DL (ref 1.6–2.4)
MCH RBC QN AUTO: 30.9 PG (ref 26.6–33)
MCHC RBC AUTO-ENTMCNC: 35.3 G/DL (ref 31.5–35.7)
MCV RBC AUTO: 87.5 FL (ref 79–97)
MONOCYTES # BLD AUTO: 0.31 10*3/MM3 (ref 0.1–0.9)
MONOCYTES NFR BLD AUTO: 5.6 % (ref 5–12)
NEUTROPHILS NFR BLD AUTO: 3.58 10*3/MM3 (ref 1.7–7)
NEUTROPHILS NFR BLD AUTO: 64.1 % (ref 42.7–76)
NITRITE UR QL STRIP: NEGATIVE
NRBC BLD AUTO-RTO: 0 /100 WBC (ref 0–0.2)
PH UR STRIP.AUTO: 6.5 [PH] (ref 4.5–8)
PHOSPHATE SERPL-MCNC: 3 MG/DL (ref 2.5–4.5)
PLATELET # BLD AUTO: 277 10*3/MM3 (ref 140–450)
PMV BLD AUTO: 9.2 FL (ref 6–12)
POTASSIUM SERPL-SCNC: 4.1 MMOL/L (ref 3.5–5.2)
PROT SERPL-MCNC: 7.4 G/DL (ref 6–8.5)
PROT UR QL STRIP: ABNORMAL
PROTHROMBIN TIME: 14 SECONDS (ref 12.1–15)
RBC # BLD AUTO: 4.08 10*6/MM3 (ref 3.77–5.28)
RBC # UR STRIP: ABNORMAL /HPF
REF LAB TEST METHOD: ABNORMAL
SODIUM SERPL-SCNC: 136 MMOL/L (ref 136–145)
SP GR UR STRIP: 1.02 (ref 1–1.03)
SQUAMOUS #/AREA URNS HPF: ABNORMAL /HPF
TROPONIN T DELTA: 3 NG/L
TROPONIN T SERPL HS-MCNC: 15 NG/L
UROBILINOGEN UR QL STRIP: ABNORMAL
WBC # UR STRIP: ABNORMAL /HPF
WBC NRBC COR # BLD: 5.58 10*3/MM3 (ref 3.4–10.8)
WHOLE BLOOD HOLD COAG: NORMAL
WHOLE BLOOD HOLD SPECIMEN: NORMAL

## 2023-05-22 PROCEDURE — 83690 ASSAY OF LIPASE: CPT | Performed by: EMERGENCY MEDICINE

## 2023-05-22 PROCEDURE — 84484 ASSAY OF TROPONIN QUANT: CPT | Performed by: EMERGENCY MEDICINE

## 2023-05-22 PROCEDURE — 80053 COMPREHEN METABOLIC PANEL: CPT | Performed by: EMERGENCY MEDICINE

## 2023-05-22 PROCEDURE — 84100 ASSAY OF PHOSPHORUS: CPT | Performed by: EMERGENCY MEDICINE

## 2023-05-22 PROCEDURE — 85610 PROTHROMBIN TIME: CPT | Performed by: EMERGENCY MEDICINE

## 2023-05-22 PROCEDURE — 81001 URINALYSIS AUTO W/SCOPE: CPT | Performed by: EMERGENCY MEDICINE

## 2023-05-22 PROCEDURE — 36415 COLL VENOUS BLD VENIPUNCTURE: CPT

## 2023-05-22 PROCEDURE — 87086 URINE CULTURE/COLONY COUNT: CPT | Performed by: EMERGENCY MEDICINE

## 2023-05-22 PROCEDURE — 85025 COMPLETE CBC W/AUTO DIFF WBC: CPT | Performed by: EMERGENCY MEDICINE

## 2023-05-22 PROCEDURE — 99284 EMERGENCY DEPT VISIT MOD MDM: CPT

## 2023-05-22 PROCEDURE — 25010000002 CEFTRIAXONE SODIUM-DEXTROSE 1-3.74 GM-%(50ML) RECONSTITUTED SOLUTION: Performed by: EMERGENCY MEDICINE

## 2023-05-22 PROCEDURE — 85730 THROMBOPLASTIN TIME PARTIAL: CPT | Performed by: EMERGENCY MEDICINE

## 2023-05-22 PROCEDURE — 93005 ELECTROCARDIOGRAM TRACING: CPT | Performed by: EMERGENCY MEDICINE

## 2023-05-22 PROCEDURE — 83735 ASSAY OF MAGNESIUM: CPT | Performed by: EMERGENCY MEDICINE

## 2023-05-22 PROCEDURE — 96365 THER/PROPH/DIAG IV INF INIT: CPT

## 2023-05-22 PROCEDURE — 71045 X-RAY EXAM CHEST 1 VIEW: CPT

## 2023-05-22 RX ORDER — ACETAMINOPHEN 500 MG
1000 TABLET ORAL ONCE
Status: COMPLETED | OUTPATIENT
Start: 2023-05-22 | End: 2023-05-22

## 2023-05-22 RX ORDER — SODIUM CHLORIDE 0.9 % (FLUSH) 0.9 %
10 SYRINGE (ML) INJECTION AS NEEDED
Status: DISCONTINUED | OUTPATIENT
Start: 2023-05-22 | End: 2023-05-22 | Stop reason: HOSPADM

## 2023-05-22 RX ORDER — CEPHALEXIN 500 MG/1
500 CAPSULE ORAL EVERY 12 HOURS
Qty: 14 CAPSULE | Refills: 0 | Status: SHIPPED | OUTPATIENT
Start: 2023-05-22 | End: 2023-05-29

## 2023-05-22 RX ORDER — CEFTRIAXONE 1 G/50ML
1 INJECTION, SOLUTION INTRAVENOUS ONCE
Status: COMPLETED | OUTPATIENT
Start: 2023-05-22 | End: 2023-05-22

## 2023-05-22 RX ORDER — CEPHALEXIN 500 MG/1
500 CAPSULE ORAL ONCE
Status: DISCONTINUED | OUTPATIENT
Start: 2023-05-22 | End: 2023-05-22

## 2023-05-22 RX ADMIN — CEFTRIAXONE 1 G: 1 INJECTION, SOLUTION INTRAVENOUS at 17:30

## 2023-05-22 RX ADMIN — ACETAMINOPHEN 1000 MG: 500 TABLET ORAL at 17:32

## 2023-05-22 NOTE — ED PROVIDER NOTES
Subjective   History of Present Illness  Patient presents with numerous complaints that have all occurred at different times.  Patient and patient's son says for the last 2 weeks patient's had a nonproductive cough and sore throat.  Over the last few days patient's also developed some chest pain this intermittent and usually associated with a cough but she has had some abdominal pain that is burning in her epigastrium that radiates up into her mid chest.  Patient has periodic shortness of breath but it is only associated with the pain in her stomach and chest.  Patient recently saw her cardiologist, Dr. Gandara and was put on an additional blood pressure medicine.  Patient's son says that all of the patient's symptoms started when she started the new medications but they have not followed back up with Dr. Gandara.  No therapy taken prior to arrival.  Patient denies any recent travel, sick contacts, bad food exposure, or trauma.  Patient still able to do all of her typical activities of daily living and has had no recent exertional chest pain.  Patient says when the pains come she does not feel like she is going to pass out, and does not get sweaty.  No radiation of the pain into her neck, shoulders, back, or jaw.        Review of Systems   All other systems reviewed and are negative.      Past Medical History:   Diagnosis Date   • CAD (coronary artery disease)    • Carotid artery stenosis    • Hypertension    • Intermittent claudication    • Ischemic cardiomyopathy    • PAD (peripheral artery disease)    • Stenosis of iliac artery        Allergies   Allergen Reactions   • Benazepril Cough   • Morphine And Related    • Penicillins Rash       Past Surgical History:   Procedure Laterality Date   • BILATERAL OOPHORECTOMY     • BUNIONECTOMY     • CARDIAC CATHETERIZATION     • CATARACT EXTRACTION     • CORONARY ANGIOPLASTY  10/2013    2 stents placed   • HERNIA REPAIR     • HYSTERECTOMY         Family History    Problem Relation Age of Onset   • Anuerysm Father        Social History     Socioeconomic History   • Marital status:    Tobacco Use   • Smoking status: Former     Packs/day: 0.50     Years: 57.00     Pack years: 28.50     Types: Cigarettes     Quit date: 2023     Years since quittin.0   • Smokeless tobacco: Never   Substance and Sexual Activity   • Alcohol use: No     Comment: Caffeine use: 2-4 cups daily   • Drug use: Never     Comment: CAFFEINE USE   • Sexual activity: Defer           Objective   Physical Exam  Vitals and nursing note reviewed.   Constitutional:       Appearance: Normal appearance.      Comments: Patient sitting in bed in no apparent distress.  Comfortable, conversive, pleasant.   HENT:      Head: Normocephalic.      Right Ear: External ear normal.      Left Ear: External ear normal.      Mouth/Throat:      Mouth: Mucous membranes are moist.      Pharynx: Oropharynx is clear. No oropharyngeal exudate or posterior oropharyngeal erythema.   Eyes:      Conjunctiva/sclera: Conjunctivae normal.      Pupils: Pupils are equal, round, and reactive to light.   Cardiovascular:      Rate and Rhythm: Normal rate and regular rhythm.      Pulses:           Radial pulses are 2+ on the right side and 2+ on the left side.        Dorsalis pedis pulses are 2+ on the right side and 2+ on the left side.        Posterior tibial pulses are 2+ on the right side and 2+ on the left side.      Heart sounds: Normal heart sounds.   Pulmonary:      Effort: Pulmonary effort is normal.      Breath sounds: Normal breath sounds. No wheezing, rhonchi or rales.   Abdominal:      General: Abdomen is flat. There is no distension.      Palpations: Abdomen is soft.      Tenderness: There is no abdominal tenderness. There is no right CVA tenderness, left CVA tenderness or guarding.      Comments: Active bowel sounds heard in all 4 quadrants.   Musculoskeletal:         General: No swelling or tenderness.      Cervical  back: Neck supple.      Right lower leg: No edema.      Left lower leg: No edema.   Lymphadenopathy:      Cervical: No cervical adenopathy.   Skin:     General: Skin is warm and dry.      Capillary Refill: Capillary refill takes 2 to 3 seconds.   Neurological:      Mental Status: She is alert and oriented to person, place, and time.      Sensory: No sensory deficit.      Motor: No weakness.   Psychiatric:         Mood and Affect: Mood normal.         Behavior: Behavior normal.         ECG 12 Lead      Date/Time: 5/22/2023 3:56 PM  Performed by: Augustine Sampson MD  Authorized by: Augustine Sampson MD   Interpreted by physician  Comparison: compared with previous ECG from 5/8/2023  Comments: Rate of 66, sinus rhythm, normal axis, occasional PVC, Q waves in 3 and aVF                 ED Course                      HEART Score: 4                      Medical Decision Making  ddx viral syndrome, bronchitis, allergies, pleurisy, ACS, UTI, gastritis, gastroenteritis, sepsis    Labs Reviewed  URINALYSIS W/ CULTURE IF INDICATED - Abnormal; Notable for the following components:     Appearance, UA                  (*)                  Ketones, UA                     (*)                  Blood, UA                     Trace (*)               Protein, UA                     (*)                  Leuk Esterase, UA             Large (3+) (*)            All other components within normal limits         Narrative: In absence of clinical symptoms, the presence of pyuria, bacteria, and/or nitrites on the urinalysis result does not correlate with infection.  TROPONIN - Abnormal; Notable for the following components:     HS Troponin T                 15 (*)              All other components within normal limits         Narrative: High Sensitive Troponin T Reference Range:                  <10.0 ng/L- Negative Female for AMI                  <15.0 ng/L- Negative Male for AMI                  >=10 - Abnormal Female indicating possible  myocardial injury.                  >=15 - Abnormal Male indicating possible myocardial injury.                   Clinicians would have to utilize clinical acumen, EKG, Troponin, and serial changes to determine if it is an Acute Myocardial Infarction or myocardial injury due to an underlying chronic condition.                                       HIGH SENSITIVITIY TROPONIN T 2HR - Abnormal; Notable for the following components:     HS Troponin T                 18 (*)              All other components within normal limits         Narrative: High Sensitive Troponin T Reference Range:                  <10.0 ng/L- Negative Female for AMI                  <15.0 ng/L- Negative Male for AMI                  >=10 - Abnormal Female indicating possible myocardial injury.                  >=15 - Abnormal Male indicating possible myocardial injury.                   Clinicians would have to utilize clinical acumen, EKG, Troponin, and serial changes to determine if it is an Acute Myocardial Infarction or myocardial injury due to an underlying chronic condition.                                       URINALYSIS, MICROSCOPIC ONLY - Abnormal; Notable for the following components:     RBC, UA                       13-20 (*)               WBC, UA                         (*)                  Bacteria, UA                  2+ (*)              All other components within normal limits  PROTIME-INR - Normal         Narrative: Therapeutic Ranges for INR: 2.0-3.0 (PT 20-30)                                              2.5-3.5 (PT 25-34)  APTT - Normal         Narrative: PTT = The equivalent PTT values for the therapeutic range of heparin levels at 0.1 to 0.7 U/ml are 53 to 110 seconds.                    LIPASE - Normal  MAGNESIUM - Normal  PHOSPHORUS - Normal  CBC WITH AUTO DIFFERENTIAL - Normal  URINE CULTURE  COMPREHENSIVE METABOLIC PANEL         Narrative: GFR Normal >60                  Chronic Kidney Disease <60                   Kidney Failure <15                                    The GFR formula is only valid for adults with stable renal function between ages 18 and 70.  RAINBOW DRAW         Narrative: The following orders were created for panel order [place] Draw.                  Procedure                               Abnormality         Status                                     ---------                               -----------         ------                                     Green Top (Gel)([id number])                                  Final result                               Lavender Top([id number])                                     Final result                               Gold Top - SST([id number])                                   Final result                               Light Blue Top([id number])                                   Final result                                                 Please view results for these tests on the individual orders.  CBC AND DIFFERENTIAL         Narrative: The following orders were created for panel order CBC & Differential.                  Procedure                               Abnormality         Status                                     ---------                               -----------         ------                                     CBC Auto Differential([id number])        Normal              Final result                                                 Please view results for these tests on the individual orders.  GREEN TOP  LAVENDER TOP  GOLD TOP - SST  LIGHT BLUE TOP    XR Chest 1 View    Result Date: 5/22/2023   1. Borderline cardiac size and probable underlying emphysematous change. No superimposed active disease. We have no comparisons.  This report was finalized on 5/22/2023 4:45 PM by Dr. Allen Lopez MD.      1933 Pt seen again prior to d/c.  Labs/Imaging reviewed and are unremarkable.  Symptoms improved and pt feels better; vitals stable and pt. in NAD. Non-toxic.  Comfortable. Ambulating without difficulty.  Tolerating po.  Relaxed breathing.  All questions personally answered at the bedside and all d/c instructions personally reviewed with pt.  Discussed the importance of close outpt. f/u and pt. understands this and agrees to do so.  Pt agrees to return to ED immediately for any new, persistent, or worsening symptoms.    EMR Dragon/Transcription disclaimer:  Much of this encounter note is an electronic transcription/translation of spoken language to printed text, aka voice recognition.  The electronic translation of spoken language may permit erroneous or at times nonsensical words or phrases to be inadvertently transcribed; although I have reviewed the note for such errors, some may still exist so please interpret based on surrounding text content.      Amount and/or Complexity of Data Reviewed  Labs: ordered.  Radiology: ordered.  ECG/medicine tests: ordered and independent interpretation performed.      Risk  OTC drugs.  Prescription drug management.          Final diagnoses:   Chest pain, unspecified type       ED Disposition  ED Disposition     ED Disposition   Discharge    Condition   Stable    Comment   --             Letty Boone MD  [address] [place] [address]   [facility] [address] [phone]    In 3 days           Medication List      New Prescriptions    cephalexin 500 MG capsule  Commonly known as: KEFLEX  Take 1 capsule by mouth Every 12 (Twelve) Hours for 7 days.           Where to Get Your Medications      These medications were sent to [facility] [address] [place] [address] - [address] [facility] - [phone]  - [phone]  [address] [facility] [address]    Phone: [phone]   · cephalexin 500 MG capsule          Augustine Sampson MD  05/22/23 8318

## 2023-05-23 LAB
BACTERIA SPEC AEROBE CULT: NORMAL
QT INTERVAL: 450 MS

## 2024-03-16 DIAGNOSIS — I70.0 ATHEROSCLEROSIS OF AORTA: ICD-10-CM

## 2024-03-16 DIAGNOSIS — I73.9 PAD (PERIPHERAL ARTERY DISEASE): ICD-10-CM

## 2024-03-16 DIAGNOSIS — I65.23 BILATERAL CAROTID ARTERY STENOSIS: Primary | ICD-10-CM

## 2024-03-25 DIAGNOSIS — Z72.0 TOBACCO USE: ICD-10-CM

## 2024-03-25 DIAGNOSIS — R07.2 PRECORDIAL PAIN: ICD-10-CM

## 2024-03-25 DIAGNOSIS — I25.810 CORONARY ARTERY DISEASE INVOLVING CORONARY BYPASS GRAFT OF NATIVE HEART WITHOUT ANGINA PECTORIS: ICD-10-CM

## 2024-03-25 DIAGNOSIS — R00.2 PALPITATIONS: ICD-10-CM

## 2024-03-25 DIAGNOSIS — R06.09 DYSPNEA ON EXERTION: ICD-10-CM

## 2024-03-25 DIAGNOSIS — I25.5 CARDIOMYOPATHY, ISCHEMIC: ICD-10-CM

## 2024-03-25 DIAGNOSIS — I73.9 PAD (PERIPHERAL ARTERY DISEASE): ICD-10-CM

## 2024-03-25 RX ORDER — AMLODIPINE BESYLATE 5 MG/1
5 TABLET ORAL NIGHTLY
Qty: 90 TABLET | Refills: 0 | OUTPATIENT
Start: 2024-03-25

## 2024-04-20 DIAGNOSIS — I25.5 CARDIOMYOPATHY, ISCHEMIC: ICD-10-CM

## 2024-04-20 DIAGNOSIS — I25.810 CORONARY ARTERY DISEASE INVOLVING CORONARY BYPASS GRAFT OF NATIVE HEART WITHOUT ANGINA PECTORIS: ICD-10-CM

## 2024-04-20 DIAGNOSIS — R00.2 PALPITATIONS: ICD-10-CM

## 2024-04-20 DIAGNOSIS — R07.2 PRECORDIAL PAIN: ICD-10-CM

## 2024-04-20 DIAGNOSIS — Z72.0 TOBACCO USE: ICD-10-CM

## 2024-04-20 DIAGNOSIS — I73.9 PAD (PERIPHERAL ARTERY DISEASE): ICD-10-CM

## 2024-04-20 DIAGNOSIS — R06.09 DYSPNEA ON EXERTION: ICD-10-CM

## 2024-04-22 RX ORDER — AMLODIPINE BESYLATE 5 MG/1
5 TABLET ORAL NIGHTLY
Qty: 90 TABLET | Refills: 0 | OUTPATIENT
Start: 2024-04-22

## 2024-04-22 NOTE — TELEPHONE ENCOUNTER
Failed protocol    NOV-LM on VM to CB  LOV-05/08/23-RM       Plan:       Start losartan 25 mg nightly in addition to amlodipine and carvedilol.  She still does not want to take a statin.  See [place] in 6 months to recheck blood pressure and symptoms.  We will get records from [facility].  No other testing at this time.

## 2024-04-26 ENCOUNTER — OFFICE VISIT (OUTPATIENT)
Dept: CARDIOLOGY | Facility: CLINIC | Age: 79
End: 2024-04-26
Payer: MEDICARE

## 2024-04-26 VITALS
BODY MASS INDEX: 20.89 KG/M2 | DIASTOLIC BLOOD PRESSURE: 78 MMHG | OXYGEN SATURATION: 97 % | WEIGHT: 130 LBS | RESPIRATION RATE: 18 BRPM | HEIGHT: 66 IN | HEART RATE: 73 BPM | SYSTOLIC BLOOD PRESSURE: 130 MMHG

## 2024-04-26 DIAGNOSIS — I73.9 PAD (PERIPHERAL ARTERY DISEASE): ICD-10-CM

## 2024-04-26 DIAGNOSIS — R00.2 PALPITATIONS: ICD-10-CM

## 2024-04-26 DIAGNOSIS — Z72.0 TOBACCO USE: ICD-10-CM

## 2024-04-26 DIAGNOSIS — I25.5 CARDIOMYOPATHY, ISCHEMIC: ICD-10-CM

## 2024-04-26 DIAGNOSIS — I10 PRIMARY HYPERTENSION: ICD-10-CM

## 2024-04-26 DIAGNOSIS — I25.810 CORONARY ARTERY DISEASE INVOLVING CORONARY BYPASS GRAFT OF NATIVE HEART WITHOUT ANGINA PECTORIS: Primary | ICD-10-CM

## 2024-04-26 DIAGNOSIS — R07.2 PRECORDIAL PAIN: ICD-10-CM

## 2024-04-26 DIAGNOSIS — R06.09 DYSPNEA ON EXERTION: ICD-10-CM

## 2024-04-26 PROCEDURE — 99214 OFFICE O/P EST MOD 30 MIN: CPT | Performed by: NURSE PRACTITIONER

## 2024-04-26 PROCEDURE — 1160F RVW MEDS BY RX/DR IN RCRD: CPT | Performed by: NURSE PRACTITIONER

## 2024-04-26 PROCEDURE — 3075F SYST BP GE 130 - 139MM HG: CPT | Performed by: NURSE PRACTITIONER

## 2024-04-26 PROCEDURE — 3078F DIAST BP <80 MM HG: CPT | Performed by: NURSE PRACTITIONER

## 2024-04-26 PROCEDURE — 93000 ELECTROCARDIOGRAM COMPLETE: CPT | Performed by: NURSE PRACTITIONER

## 2024-04-26 PROCEDURE — 1159F MED LIST DOCD IN RCRD: CPT | Performed by: NURSE PRACTITIONER

## 2024-04-26 RX ORDER — AMLODIPINE BESYLATE 5 MG/1
5 TABLET ORAL NIGHTLY
Qty: 90 TABLET | Refills: 3 | Status: SHIPPED | OUTPATIENT
Start: 2024-04-26

## 2024-04-26 RX ORDER — CARVEDILOL 3.12 MG/1
3.12 TABLET ORAL 2 TIMES DAILY WITH MEALS
Qty: 180 TABLET | Refills: 3 | Status: SHIPPED | OUTPATIENT
Start: 2024-04-26

## 2024-04-26 RX ORDER — ATORVASTATIN CALCIUM 20 MG/1
20 TABLET, FILM COATED ORAL EVERY EVENING
Qty: 90 TABLET | Refills: 3 | Status: SHIPPED | OUTPATIENT
Start: 2024-04-26

## 2024-04-26 RX ORDER — ATORVASTATIN CALCIUM 20 MG/1
20 TABLET, FILM COATED ORAL EVERY EVENING
COMMUNITY
Start: 2024-03-29 | End: 2024-04-26 | Stop reason: SDUPTHER

## 2024-04-26 NOTE — PROGRESS NOTES
[facility] CARDIOLOGY  [address] [facility]  [address]  [place] [address]  Phone: [phone]  Fax: [phone]      Patient Name: Marlin Pedroza  :1945  Age: 78 y.o.  Primary Cardiologist: Olamide Whaley MD  Encounter Provider:  GUILLERMINA Crowe      Chief Complaint     Chief Complaint: Follow-up (Patient is overdue for follow up/History of CAD/Ischemic Cardiomyopathy/H/o CABG)  Chest pain    SUBJECTIVE     History of Present Illness:  Marlin Pedroza is a 78 y.o.  female whose medical history includes vascular disease, Raynaud's disease, and current smoker.  She is followed in our office by Dr. Whaley for coronary artery disease.     24 Follow-up:  She is here for one year follow-up.  She is feeling good.  She was unable to tolerate losartan due to feeling very fatigued.  She then had a syncopal episode on the toilet and was diagnosed with UTI.  Her blood pressures been controlled off the losartan.  She continues to take Lipitor.  She continues to smoke but is considering quitting.  She is just had trouble with smoking aids in the past.  She is not having chest pain, dyspnea with exertion, palpitations, lightheadedness, or leg swelling.    Below is a summary of pertinent cardiology findings:  Vascular disease (she follows with Dr. Clayton): Severe disease of the right external iliac, right common iliac, occlusion of the right superficial femoral artery with single-vessel runoff to the right posterior tibial artery, severe disease on the left side but patent left superficial femoral artery to the left.  Known carotid artery disease with 10 to 49% left internal carotid artery stenosis and 50 to 60% right internal carotid artery stenosis.  2013 right common and deep femoral endarterectomy, and right common and external iliac stent placed.  2019 carotid artery Dopplers showed 60 to 70% right internal carotid artery stenosis and 50 to 60% left  internal carotid artery stenosis.  LAYNE in the right was 0.69 and on the left 0.95.  August 2020 vascular assessment showed patent abdominal aorta, patent right common and external iliac artery stents, widely patent common femoral artery on the right.  Left common and external iliac arteries had extensive calcification with mild stenosis which was stable.  ABIs showed moderate occlusive disease bilaterally which was stable.  Carotid Doppler showed 60 to 70% right internal carotid artery stenosis and 60 to 70% left internal carotid artery stenosis which is stable.  Coronary artery disease: October 2013 echocardiogram for  Q waves and T wave inversions in the inferior leads showed EF 42%, mild LV dilation, inferior wall and septal wall hypokinesis, moderate mitral insufficiency, mild tricuspid insufficiency, and grade 1 diastolic dysfunction.  October 2013 stress PET study showed a small area of infarct in inferior wall with a large amount of maryam-infarct ischemia.  October 2013 coronary angiography showed EF 50% with inferobasilar akinesis and mild inferior wall hypokinesis, the LAD had sequential proximal 40% stenosis and 50 to 60% mid vessel stenosis, first diagonal branch was patent, left circumflex was patent, obtuse marginal branch had 20% proximal stenosis, the RCA was 100% proximally occluded and fills briskly by ipsilateral and contralateral collaterals; she was treated medically.  August 2022 LAYNE were stable bilaterally, she had 50-60% bilateral carotid artery stenosis.   October 2022 myocardial perfusion stress study showed a medium sized infarct located in the inferior wall with no significant ischemia and EF 42%.  She was treated with the addition of amlodipine.  October 2022 echocardiogram showed EF 45%, mid inferior and mid inferoseptal hypokinesis, akinesis of the basal inferior and basal inferoseptal wall, normal LV diastolic function, normal RVSP, and GLS -11.6%.    Past Medical History:   Diagnosis  Date    CAD (coronary artery disease)     Carotid artery stenosis     Hypertension     Intermittent claudication     Ischemic cardiomyopathy     PAD (peripheral artery disease)     Stenosis of iliac artery        Past Surgical History:  Hernia repair  Hysterectomy    Social History     Socioeconomic History    Marital status:    Tobacco Use    Smoking status: Former     Current packs/day: 0.00     Average packs/day: 0.5 packs/day for 57.0 years (28.5 ttl pk-yrs)     Types: Cigarettes     Start date: 1966     Quit date: 2023     Years since quittin.9    Smokeless tobacco: Never   Vaping Use    Vaping status: Never Used   Substance and Sexual Activity    Alcohol use: No     Comment: Caffeine use: 2-4 cups daily    Drug use: Never     Comment: CAFFEINE USE    Sexual activity: Defer         Review of Systems     Review of Systems   Cardiovascular:  Negative for chest pain, claudication, cyanosis, dyspnea on exertion, irregular heartbeat, leg swelling, near-syncope, orthopnea, palpitations, paroxysmal nocturnal dyspnea and syncope.   Musculoskeletal:  Positive for back pain.       Medications     Allergies as of 2024 - Reviewed 2024   Allergen Reaction Noted    Benazepril Cough 2023    Morphine and related  2016    Penicillins Rash 2013       Current Outpatient Medications on File Prior to Visit   Medication Sig    aspirin 81 MG tablet Take by mouth daily.    Cholecalciferol (VITAMIN D3) 2000 UNITS tablet Take 1 tablet by mouth Daily.    esomeprazole (NexIUM) 40 MG capsule Take 1 capsule by mouth Daily.    vitamin B-12 (CYANOCOBALAMIN) 2500 MCG sublingual tablet tablet Place under the tongue daily.    vitamin B-6 (PYRIDOXINE) 100 MG tablet Take 1 tablet by mouth Daily.    [DISCONTINUED] amLODIPine (NORVASC) 5 MG tablet Take 1 tablet by mouth Every Night.    [DISCONTINUED] atorvastatin (LIPITOR) 20 MG tablet Take 1 tablet by mouth Every Evening.    [DISCONTINUED] carvedilol  "(COREG) 3.125 MG tablet Take 1 tablet by mouth 2 (Two) Times a Day With Meals.    [DISCONTINUED] losartan (Cozaar) 25 MG tablet Take 1 tablet by mouth Every Night. (Patient not taking: Reported on 4/26/2024)     No current facility-administered medications on file prior to visit.          OBJECTIVE     Vital Signs:   /78 (BP Location: Right arm, Patient Position: Sitting, Cuff Size: Adult)   Pulse 73   Resp 18   Ht 167.6 cm (66\")   Wt 59 kg (130 lb)   SpO2 97%   BMI 20.98 kg/m²       Weight:  Wt Readings from Last 3 Encounters:   04/26/24 59 kg (130 lb)   05/22/23 55.8 kg (123 lb)   05/08/23 57.2 kg (126 lb)     Body mass index is 20.98 kg/m².      Physical Exam     Physical Exam  Constitutional:       General: She is not in acute distress.  HENT:      Head: Normocephalic and atraumatic.      Mouth/Throat:      Mouth: Mucous membranes are moist.   Eyes:      General: No scleral icterus.     Extraocular Movements: Extraocular movements intact.      Conjunctiva/sclera: Conjunctivae normal.      Pupils: Pupils are equal, round, and reactive to light.   Cardiovascular:      Rate and Rhythm: Normal rate and regular rhythm.      Pulses: Normal pulses.      Heart sounds: S1 normal and S2 normal.   Pulmonary:      Effort: No respiratory distress.      Breath sounds: Normal breath sounds. No wheezing, rhonchi or rales.      Comments: Scattered wheezes  Abdominal:      General: Bowel sounds are normal. There is no distension.      Palpations: Abdomen is soft.      Tenderness: There is no abdominal tenderness.   Musculoskeletal:         General: Normal range of motion.      Cervical back: Normal range of motion and neck supple.      Right lower leg: No edema.      Left lower leg: No edema.   Skin:     General: Skin is warm and dry.      Coloration: Skin is not jaundiced.   Neurological:      Mental Status: She is alert and oriented to person, place, and time.   Psychiatric:         Mood and Affect: Mood normal. "         Reviewed Data     Result Review :  The following data was reviewed by GUILLERMINA Crowe on 04/26/24:  Labs 02/25/2020:  cr 0.8, K 4.3, otherwise unremarkable CMP, Hgb 13.2, Plt 239  Labs 09/30/2022: cr 0.8, K 4.1, , otherwise unremarkable CMP, Hgb 13.0, , Chol 273, HDL 67, , Trig 90, TSH 1.630, proBNP 580, troponin less than 0.010  05/22/2023:  cr 0.7, K 4.1, otherwise unremarkable CMP, troponin 15/18, hgb 12.6, plt 277      ECG 12 Lead    Date/Time: 4/26/2024 3:23 PM  Performed by: Princess Kulkarni APRN    Authorized by: Princess Kulkarni APRN  Comparison: compared with previous ECG from 5/22/2023  Rhythm: sinus rhythm  Rate: normal  BPM: 71  T inversion: III and aVF          Assessment and Plan        Assessment and Plan     Assessment:  1. Coronary artery disease involving coronary bypass graft of native heart without angina pectoris    2. Cardiomyopathy, ischemic    3. Primary hypertension    4. PAD (peripheral artery disease)    5. Tobacco use    6. Precordial pain    7. Dyspnea on exertion    8. Palpitations           Coronary artery disease: EKG in October 2013 showed inferior Q waves and T wave inversions.  She had an abnormal stress PET study and coronary angiography showed nonobstructive disease in the LAD and obtuse marginal branch.  There was 100% proximal occlusion of the RCA with ipsilateral and contralateral collaterals; she was treated medically.  October 2022 myocardial perfusion stress study showed old infarct in the inferior wall but no new ischemia.  Her medical therapy includes carvedilol, amlodipine, 81 mg aspirin and Lipitor.  She denies chest pain.  Ischemic cardiomyopathy: EF in October 2013 showed echocardiogram 42% and grade 1 diastolic dysfunction.  October 2022 echocardiogram showed EF 45% and inferior wall motion abnormalities.  Her medical therapy includes carvedilol; she was unable to tolerate losartan she is euvolemic..   She has been reluctant to try other medications due to their cost.  Hypertension: Controlled on 5 mg amlodipine and 3.125 mg carvedilol twice daily. She could not tolerate losartan.  Peripheral artery disease: She has a significant history of peripheral artery disease and follows with Dr. Clayton.  In November 2013 she had right common and deep femoral endarterectomy, and right common and external iliac stents placed.  Most recent vascular assessments showed right leg stents to be patent but left common and external iliac arteries was thick extensive calcification and mild stenosis; this was stable.  Carotid artery Doppler showed 60 to 70% bilateral carotid artery stenosis which is stable.  ABIs showed moderate occlusive disease bilaterally which is stable.  Stable at last check.  Tobacco abuse: She continues to smoke but is looking into options to quit.  Palpitations: These have resolved.    Plan:  Ms. Pedroza is a patient of Dr. 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  She had a nonischemic stress nuclear perfusion study in October 2022.  She also has history of PE ACs which are better controlled on low-dose carvedilol.  Her blood pressure is also controlled with the addition of amlodipine.  She is currently taking aspirin and atorvastatin.  She is actually doing quite well.  She does have vascular disease and is considering smoking which is a big step for her.  I will make no medication changes today.  I will have her see Dr. Whaley in 1 year.      Follow Up:  Return in about 1 year (around 4/26/2025) for Follow-up, Dr. Whaley.  Orders Placed This Encounter   Procedures    ECG 12 Lead      New Medications Ordered This Visit   Medications    amLODIPine (NORVASC) 5 MG tablet     Sig: Take 1 tablet by mouth Every Night.     Dispense:  90 tablet     Refill:  3    carvedilol (COREG) 3.125 MG tablet     Sig: Take 1 tablet by mouth 2 (Two)  Times a Day With Meals.     Dispense:  180 tablet     Refill:  3    atorvastatin (LIPITOR) 20 MG tablet     Sig: Take 1 tablet by mouth Every Evening.     Dispense:  90 tablet     Refill:  3         Thank you the opportunity to participate in this patient's care.    GUILLERMINA Fox    This office note has been dictated.

## 2024-12-20 ENCOUNTER — HOSPITAL ENCOUNTER (OUTPATIENT)
Facility: HOSPITAL | Age: 79
Discharge: HOME | End: 2024-12-20
Payer: MEDICARE

## 2024-12-20 DIAGNOSIS — I70.0 ATHEROSCLEROSIS OF AORTA: ICD-10-CM

## 2024-12-20 DIAGNOSIS — I73.9 PAD (PERIPHERAL ARTERY DISEASE): ICD-10-CM

## 2024-12-20 DIAGNOSIS — I65.23 BILATERAL CAROTID ARTERY STENOSIS: ICD-10-CM

## 2024-12-20 LAB
ABDOMINAL DIST AORTA AP: 1.6 CM
ABDOMINAL DIST AORTA TRANS: 1.5 CM
ABDOMINAL DIST AORTA VEL: 117.9 CM/S
ABDOMINAL LT COM ILIAC AP: 1.1 CM
ABDOMINAL LT COM ILIAC VEL: 180.6 CM/S
ABDOMINAL LT EXT ILIAC VEL: 191.3 CM/S
ABDOMINAL MID AORTA AP: 2 CM
ABDOMINAL MID AORTA TRANS: 1.9 CM
ABDOMINAL MID AORTA VEL: 56.5 CM/S
ABDOMINAL PROX AORTA AP: 2.4 CM
ABDOMINAL PROX AORTA TRANS: 2.4 CM
ABDOMINAL PROX AORTA VEL: 65.7 CM/S
ABDOMINAL RT COM ILIAC AP: 1 CM
ABDOMINAL RT COM ILIAC VEL: 146.7 CM/S
ABDOMINAL RT EXT ILIAC VEL: 191.3 CM/S
BH CV LOWER ARTERIAL LEFT ABI RATIO: 0.67
BH CV LOWER ARTERIAL LEFT POST TIBIAL SYS MAX: 108
BH CV LOWER ARTERIAL RIGHT ABI RATIO: 0.7
BH CV LOWER ARTERIAL RIGHT DORSALIS PEDIS SYS MAX: 114
BH CV VAS ABD AO RT EXTERNAL ILIAC AP: 0.9 CM
BH CV VAS DIALYSIS STENT ONE  DIST STENT PSV: 141.7 CM/SEC
BH CV VAS DIALYSIS STENT ONE MID STENT PSV: 144 CM/SEC
BH CV VAS DIALYSIS STENT ONE POST STENT PSV: 191.3 CM/SEC
BH CV VAS DIALYSIS STENT ONE PRE STENT PSV: 117.9 CM/SEC
BH CV VAS DIALYSIS STENT ONE PROX STENT PSV: 146.7 CM/SEC
BH CV VAS FREE TEXT STENT ONE: NORMAL
BH CV XLRA MEAS LEFT CAROTID BULB EDV: 35.5 CM/SEC
BH CV XLRA MEAS LEFT CAROTID BULB PSV: 155.1 CM/SEC
BH CV XLRA MEAS LEFT DIST CCA EDV: -22 CM/SEC
BH CV XLRA MEAS LEFT DIST CCA PSV: -77.3 CM/SEC
BH CV XLRA MEAS LEFT DIST ICA EDV: -43.9 CM/SEC
BH CV XLRA MEAS LEFT DIST ICA PSV: -153.7 CM/SEC
BH CV XLRA MEAS LEFT ICA/CCA RATIO: 2.51
BH CV XLRA MEAS LEFT MID CCA EDV: 26.7 CM/SEC
BH CV XLRA MEAS LEFT MID CCA PSV: 105 CM/SEC
BH CV XLRA MEAS LEFT MID ICA EDV: -42.3 CM/SEC
BH CV XLRA MEAS LEFT MID ICA PSV: -194.4 CM/SEC
BH CV XLRA MEAS LEFT PROX CCA EDV: -29.5 CM/SEC
BH CV XLRA MEAS LEFT PROX CCA PSV: -125.6 CM/SEC
BH CV XLRA MEAS LEFT PROX ECA EDV: -17.3 CM/SEC
BH CV XLRA MEAS LEFT PROX ECA PSV: -134.3 CM/SEC
BH CV XLRA MEAS LEFT PROX ICA EDV: 48.3 CM/SEC
BH CV XLRA MEAS LEFT PROX ICA PSV: 171.2 CM/SEC
BH CV XLRA MEAS LEFT PROX SCLA PSV: 197.6 CM/SEC
BH CV XLRA MEAS LEFT VERTEBRAL A EDV: -22.4 CM/SEC
BH CV XLRA MEAS LEFT VERTEBRAL A PSV: -83.4 CM/SEC
BH CV XLRA MEAS RIGHT CAROTID BULB EDV: -41.6 CM/SEC
BH CV XLRA MEAS RIGHT CAROTID BULB PSV: -156.9 CM/SEC
BH CV XLRA MEAS RIGHT DIST CCA EDV: -21.6 CM/SEC
BH CV XLRA MEAS RIGHT DIST CCA PSV: -83.1 CM/SEC
BH CV XLRA MEAS RIGHT DIST ICA EDV: -38.4 CM/SEC
BH CV XLRA MEAS RIGHT DIST ICA PSV: -119.2 CM/SEC
BH CV XLRA MEAS RIGHT ICA/CCA RATIO: 1.94
BH CV XLRA MEAS RIGHT MID CCA EDV: 18.7 CM/SEC
BH CV XLRA MEAS RIGHT MID CCA PSV: 85 CM/SEC
BH CV XLRA MEAS RIGHT MID ICA EDV: -38.7 CM/SEC
BH CV XLRA MEAS RIGHT MID ICA PSV: -160.8 CM/SEC
BH CV XLRA MEAS RIGHT PROX CCA EDV: 23 CM/SEC
BH CV XLRA MEAS RIGHT PROX CCA PSV: 108.7 CM/SEC
BH CV XLRA MEAS RIGHT PROX ECA EDV: -14.1 CM/SEC
BH CV XLRA MEAS RIGHT PROX ECA PSV: -123.9 CM/SEC
BH CV XLRA MEAS RIGHT PROX ICA EDV: -34.9 CM/SEC
BH CV XLRA MEAS RIGHT PROX ICA PSV: -157.9 CM/SEC
BH CV XLRA MEAS RIGHT PROX SCLA PSV: 101.1 CM/SEC
BH CV XLRA MEAS RIGHT VERTEBRAL A EDV: -18.6 CM/SEC
BH CV XLRA MEAS RIGHT VERTEBRAL A PSV: -73.9 CM/SEC
LEFT ARM BP: NORMAL MMHG
RIGHT ARM BP: NORMAL MMHG
UPPER ARTERIAL LEFT ARM BRACHIAL SYS MAX: NORMAL
UPPER ARTERIAL RIGHT ARM BRACHIAL SYS MAX: NORMAL

## 2024-12-20 PROCEDURE — 93880 EXTRACRANIAL BILAT STUDY: CPT

## 2024-12-20 PROCEDURE — 93922 UPR/L XTREMITY ART 2 LEVELS: CPT

## 2024-12-20 PROCEDURE — 93978 VASCULAR STUDY: CPT

## 2025-01-28 ENCOUNTER — TELEPHONE (OUTPATIENT)
Age: 80
End: 2025-01-28

## 2025-01-28 NOTE — TELEPHONE ENCOUNTER
Caller: Marlin Pedroza    Relationship: Self    Best call back number: [phone]    What is the best time to reach you: ANYTIME    Who are you requesting to speak with (clinical staff, provider,  specific staff member): SCHEDULING    Do you know the name of the person who called: PT    What was the call regarding: PT SAID SHE WAS TOLD THAT WINDY WAS JUST GOING TO CONTACT HER WITH HER RESULTS OVER THE PHONE DUE TO OFFICE HAVING MULTIPLE CANCELLATIONS. TODAY SHE HAD A CALL TELLING HER SHE HAD AN APPT FOR 1.30 THAT SHE DID NOT MAKE. SHE WOULD LIKE TO HAVE SOMEONE CONTACT HER SHE WANTS TO KEEP THE APPT AS PLANNED OVER THE PHONE. THANKS     PT LIVE [facility] IN [facility] AND HER APPT WAS CANCELLED TWICE BY THE OFFICE

## 2025-01-30 ENCOUNTER — DOCUMENTATION (OUTPATIENT)
Age: 80
End: 2025-01-30

## 2025-01-30 DIAGNOSIS — I65.23 CAROTID STENOSIS, BILATERAL: Primary | ICD-10-CM

## 2025-01-30 NOTE — PROGRESS NOTES
I called the patient and reviewed her duplex imaging with her.  She was seen for her scans on a day I was not in the office.  Her ABIs are stable with 0.7 on the right and 0.67 on the left.  Her iliac stents are patent.  On her carotid duplex, her right side is stable at less than 50%.  On the left, she had a progression to the 50 to 69% range.  I recommended a 6-month follow-up along with carotid duplex and we will get this set up in our office.

## 2025-04-07 DIAGNOSIS — Z72.0 TOBACCO USE: ICD-10-CM

## 2025-04-07 DIAGNOSIS — R06.09 DYSPNEA ON EXERTION: ICD-10-CM

## 2025-04-07 DIAGNOSIS — I25.810 CORONARY ARTERY DISEASE INVOLVING CORONARY BYPASS GRAFT OF NATIVE HEART WITHOUT ANGINA PECTORIS: ICD-10-CM

## 2025-04-07 DIAGNOSIS — R07.2 PRECORDIAL PAIN: ICD-10-CM

## 2025-04-07 DIAGNOSIS — R00.2 PALPITATIONS: ICD-10-CM

## 2025-04-07 DIAGNOSIS — I25.5 CARDIOMYOPATHY, ISCHEMIC: ICD-10-CM

## 2025-04-07 DIAGNOSIS — I73.9 PAD (PERIPHERAL ARTERY DISEASE): ICD-10-CM

## 2025-04-07 RX ORDER — AMLODIPINE BESYLATE 5 MG/1
5 TABLET ORAL NIGHTLY
Qty: 90 TABLET | Refills: 3 | Status: SHIPPED | OUTPATIENT
Start: 2025-04-07

## 2025-04-07 NOTE — TELEPHONE ENCOUNTER
Patient requesting a refill on the Amlodipine to be sent to [facility]    NOV-05/02/25-EE  LOV-04/26/24-MM    Plan:  Ms. Pedroza is a patient of Dr. 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  She had a nonischemic stress nuclear perfusion study in October 2022.  She also has history of PE ACs which are better controlled on low-dose carvedilol.  Her blood pressure is also controlled with the addition of amlodipine.  She is currently taking aspirin and atorvastatin.  She is actually doing quite well.  She does have vascular disease and is considering smoking which is a big step for her.  I will make no medication changes today.  I will have her see Dr. Whaley in 1 year.

## 2025-04-08 RX ORDER — CARVEDILOL 3.12 MG/1
3.12 TABLET ORAL 2 TIMES DAILY WITH MEALS
Qty: 180 TABLET | Refills: 0 | Status: SHIPPED | OUTPATIENT
Start: 2025-04-08

## 2025-05-02 ENCOUNTER — OFFICE VISIT (OUTPATIENT)
Dept: CARDIOLOGY | Facility: CLINIC | Age: 80
End: 2025-05-02
Payer: MEDICARE

## 2025-05-02 VITALS
WEIGHT: 116.2 LBS | HEART RATE: 67 BPM | BODY MASS INDEX: 18.68 KG/M2 | OXYGEN SATURATION: 95 % | SYSTOLIC BLOOD PRESSURE: 198 MMHG | DIASTOLIC BLOOD PRESSURE: 92 MMHG | HEIGHT: 66 IN

## 2025-05-02 DIAGNOSIS — I25.5 CARDIOMYOPATHY, ISCHEMIC: ICD-10-CM

## 2025-05-02 DIAGNOSIS — I10 PRIMARY HYPERTENSION: Primary | ICD-10-CM

## 2025-05-02 DIAGNOSIS — I25.810 CORONARY ARTERY DISEASE INVOLVING CORONARY BYPASS GRAFT OF NATIVE HEART WITHOUT ANGINA PECTORIS: ICD-10-CM

## 2025-05-02 DIAGNOSIS — Z72.0 TOBACCO USE: ICD-10-CM

## 2025-05-02 DIAGNOSIS — I73.9 PAD (PERIPHERAL ARTERY DISEASE): ICD-10-CM

## 2025-05-02 PROCEDURE — 3077F SYST BP >= 140 MM HG: CPT | Performed by: FAMILY MEDICINE

## 2025-05-02 PROCEDURE — 3080F DIAST BP >= 90 MM HG: CPT | Performed by: FAMILY MEDICINE

## 2025-05-02 PROCEDURE — 93000 ELECTROCARDIOGRAM COMPLETE: CPT | Performed by: FAMILY MEDICINE

## 2025-05-02 PROCEDURE — 99214 OFFICE O/P EST MOD 30 MIN: CPT | Performed by: FAMILY MEDICINE

## 2025-05-02 RX ORDER — ROSUVASTATIN CALCIUM 5 MG/1
5 TABLET, COATED ORAL NIGHTLY
Qty: 30 TABLET | Refills: 11 | Status: SHIPPED | OUTPATIENT
Start: 2025-05-02

## 2025-05-02 RX ORDER — AMLODIPINE BESYLATE 10 MG/1
10 TABLET ORAL NIGHTLY
Qty: 30 TABLET | Refills: 3 | Status: SHIPPED | OUTPATIENT
Start: 2025-05-02

## 2025-05-02 NOTE — PROGRESS NOTES
Date of Office Visit: 2025  Encounter Provider: GUILLERMINA Dean  Place of Service: [facility] CARDIOLOGY  Established cardiologist: Olamide Whaley MD  Patient Name: Marlin Pedroza  :1945      Patient ID:  Marlin Pedroza is a 79 y.o. female is here for  followup    With a pertinent medical history of vascular disease, Raynaud's disease, and current smoker.     History of Present Illness  Severe disease of the right external iliac, right common iliac, occlusion of the right superficial femoral artery with single-vessel runoff to the right posterior tibial artery, severe disease on the left side but patent left superficial femoral artery to the left. Known carotid artery disease with 10 to 49% left internal carotid artery stenosis and 50 to 60% right internal carotid artery stenosis. 2013 right common and deep femoral endarterectomy, and right common and external iliac stent placed.  She follows with Dr. Clayton.    2013 echocardiogram for Q waves and T wave inversions in the inferior leads showed EF 42%, mild LV dilation, inferior wall and septal wall hypokinesis, moderate mitral insufficiency, mild tricuspid insufficiency, and grade 1 diastolic dysfunction. stress PET study showed a small area of infarct in inferior wall with a large amount of maryam-infarct ischemia. coronary angiography showed EF 50% with inferobasilar akinesis and mild inferior wall hypokinesis, the LAD had sequential proximal 40% stenosis and 50 to 60% mid vessel stenosis, first diagonal branch was patent, left circumflex was patent, obtuse marginal branch had 20% proximal stenosis, the RCA was 100% proximally occluded and fills briskly by ipsilateral and contralateral collaterals; she was treated medically.     2020 vascular assessment showed patent abdominal aorta, patent right common and external iliac artery stents, widely patent common femoral artery on the right. Left  common and external iliac arteries had extensive calcification with mild stenosis which was stable. ABIs showed moderate occlusive disease bilaterally which was stable. Carotid Doppler showed 60 to 70% right internal carotid artery stenosis and 60 to 70% left internal carotid artery stenosis which is stable.     October 2022 myocardial perfusion stress study showed a medium sized infarct located in the inferior wall with no significant ischemia and EF 42%. She was treated with the addition of amlodipine.     October 2022 echocardiogram showed EF 45%, mid inferior and mid inferoseptal hypokinesis, akinesis of the basal inferior and basal inferoseptal wall, normal LV diastolic function, normal RVSP, and GLS -11.6%.     Bilateral carotid duplex 12/20/2024 showed less than 50% stenosis in the right ICA and 50 to 69% stenosis in the left ICA with bilateral carotid plaquing present.  Aorta duplex showed 12/20/2024 patent aorta with atherosclerotic plaque but no stenosis or aneurysm patent right common and external iliac stents without stenosis patent left common and external iliac arteries without stenosis.    Marlin is feeling well today.  Unfortunately [place] had [place] of [place]  She has been dealing with this.  She really enjoys reading and she lost a lot of books in the slide which is horrible.  Her blood pressure is high today she tells me it is always high.  She cannot tolerate many blood pressure medicines she tells me they make her feel very weak and fatigued.  Amlodipine thus far has been only when she has tried where she has not had any side effects.  She stopped taking atorvastatin because it was causing GI upset.  She has not had any shortness of breath, chest pain or pressure, lightheadedness, dizziness, presyncope/syncope, heart racing or palpitations.  She does have a headache when her blood pressure is high.  Does have ongoing issues with her back and chronic back pain.  "    Current Outpatient Medications on File Prior to Visit   Medication Sig Dispense Refill    aspirin 81 MG tablet Take by mouth daily.      carvedilol (COREG) 3.125 MG tablet Take 1 tablet by mouth 2 (Two) Times a Day With Meals. 180 tablet 0    Cholecalciferol (VITAMIN D3) 2000 UNITS tablet Take 1 tablet by mouth Daily.      esomeprazole (NexIUM) 40 MG capsule Take 1 capsule by mouth Daily.      vitamin B-12 (CYANOCOBALAMIN) 2500 MCG sublingual tablet tablet Place under the tongue daily.       No current facility-administered medications on file prior to visit.         Procedures    ECG 12 Lead    Date/Time: 5/2/2025 11:27 AM  Performed by: Dolores Wise APRN    Authorized by: Dolores Wise APRN  Comparison: compared with previous ECG from 4/26/2024  Rhythm: sinus rhythm  BPM: 61              Objective:      Vitals:    05/02/25 1058   BP: (!) 198/92   BP Location: Right arm   Patient Position: Sitting   Cuff Size: Adult   Pulse: 67   SpO2: 95%   Weight: 52.7 kg (116 lb 3.2 oz)   Height: 167.6 cm (66\")     Body mass index is 18.76 kg/m².  Wt Readings from Last 3 Encounters:   05/02/25 52.7 kg (116 lb 3.2 oz)   04/26/24 59 kg (130 lb)   05/22/23 55.8 kg (123 lb)         Constitutional:       Appearance: Not in distress.   Eyes:      Pupils: Pupils are equal, round, and reactive to light.   Neck:      Vascular: No JVD.   Pulmonary:      Effort: Pulmonary effort is normal.      Breath sounds: Normal breath sounds.   Cardiovascular:      Normal rate. Regular rhythm.      Murmurs: There is no murmur.   Pulses:     Intact distal pulses.   Edema:     Peripheral edema absent.   Musculoskeletal:      Cervical back: Normal range of motion. Skin:     General: Skin is warm and dry.   Neurological:      Mental Status: Alert and oriented to person, place and time.   Psychiatric:         Speech: Speech normal.       Lab Review:   No recent labs to review-requested updated labs from PCP    Assessment:     1. Primary " hypertension    2. Coronary artery disease involving coronary bypass graft of native heart without angina pectoris    3. Cardiomyopathy, ischemic    4. PAD (peripheral artery disease)    5. Tobacco use      Coronary artery disease: EKG in October 2013 showed inferior Q waves and T wave inversions.  She had an abnormal stress PET study and coronary angiography showed nonobstructive disease in the LAD and obtuse marginal branch.  There was 100% proximal occlusion of the RCA with ipsilateral and contralateral collaterals; she was treated medically.  October 2022 myocardial perfusion stress study showed old infarct in the inferior wall but no new ischemia.  Her medical therapy includes carvedilol, amlodipine, 81 mg aspirin and Lipitor.  She denies chest pain.  Ischemic cardiomyopathy: EF in October 2013 showed echocardiogram 42% and grade 1 diastolic dysfunction.  October 2022 echocardiogram showed EF 45% and inferior wall motion abnormalities.  Her medical therapy includes carvedilol; she was unable to tolerate losartan she is euvolemic..  She has been reluctant to try other medications due to their cost.  Hypertension: Blood pressures not well-controlled.  She has been under a lot of stress.  Peripheral artery disease: She has a significant history of peripheral artery disease and follows with Dr. Clayton.  In November 2013 she had right common and deep femoral endarterectomy, and right common and external iliac stents placed.  Most recent vascular assessments showed right leg stents to be patent but left common and external iliac arteries was thick extensive calcification and mild stenosis; this was stable.  Carotid artery Doppler showed 60 to 70% bilateral carotid artery stenosis which is stable.  ABIs showed moderate occlusive disease bilaterally which is stable.  Stable at last check.  Tobacco abuse: She continues to smoke but is looking into options to quit.  Palpitations: These have resolved.  Hyperlipidemia she  has had GI upset with atorvastatin    Plan:   We increased her amlodipine to 10 mg  Discontinue atorvastatin in favor of rosuvastatin hopefully this will be better tolerated  Follow-up for BP in 4 weeks          Thank you for allowing me to participate in this patient's care. Please call with any questions or concerns.          Dragon dictation device was utilized in this note.

## 2025-05-23 ENCOUNTER — APPOINTMENT (OUTPATIENT)
Dept: GENERAL RADIOLOGY | Facility: HOSPITAL | Age: 80
End: 2025-05-23
Payer: MEDICARE

## 2025-05-23 ENCOUNTER — HOSPITAL ENCOUNTER (EMERGENCY)
Facility: HOSPITAL | Age: 80
Discharge: HOME | End: 2025-05-23
Attending: STUDENT IN AN ORGANIZED HEALTH CARE EDUCATION/TRAINING PROGRAM
Payer: MEDICARE

## 2025-05-23 VITALS
WEIGHT: 117 LBS | BODY MASS INDEX: 18.8 KG/M2 | SYSTOLIC BLOOD PRESSURE: 138 MMHG | TEMPERATURE: 97.5 F | HEIGHT: 66 IN | RESPIRATION RATE: 16 BRPM | DIASTOLIC BLOOD PRESSURE: 79 MMHG | OXYGEN SATURATION: 99 % | HEART RATE: 71 BPM

## 2025-05-23 DIAGNOSIS — R07.81 RIB PAIN ON RIGHT SIDE: Primary | ICD-10-CM

## 2025-05-23 LAB
QT INTERVAL: 426 MS
QTC INTERVAL: 436 MS

## 2025-05-23 PROCEDURE — 25010000002 KETOROLAC TROMETHAMINE PER 15 MG: Performed by: STUDENT IN AN ORGANIZED HEALTH CARE EDUCATION/TRAINING PROGRAM

## 2025-05-23 PROCEDURE — 71045 X-RAY EXAM CHEST 1 VIEW: CPT

## 2025-05-23 PROCEDURE — 93005 ELECTROCARDIOGRAM TRACING: CPT | Performed by: STUDENT IN AN ORGANIZED HEALTH CARE EDUCATION/TRAINING PROGRAM

## 2025-05-23 PROCEDURE — 93010 ELECTROCARDIOGRAM REPORT: CPT | Performed by: STUDENT IN AN ORGANIZED HEALTH CARE EDUCATION/TRAINING PROGRAM

## 2025-05-23 PROCEDURE — 96372 THER/PROPH/DIAG INJ SC/IM: CPT

## 2025-05-23 PROCEDURE — 99283 EMERGENCY DEPT VISIT LOW MDM: CPT | Performed by: STUDENT IN AN ORGANIZED HEALTH CARE EDUCATION/TRAINING PROGRAM

## 2025-05-23 RX ORDER — LIDOCAINE 50 MG/G
1 PATCH TOPICAL EVERY 24 HOURS
Qty: 15 PATCH | Refills: 0 | Status: SHIPPED | OUTPATIENT
Start: 2025-05-23 | End: 2025-06-07

## 2025-05-23 RX ORDER — KETOROLAC TROMETHAMINE 30 MG/ML
15 INJECTION, SOLUTION INTRAMUSCULAR; INTRAVENOUS ONCE
Status: COMPLETED | OUTPATIENT
Start: 2025-05-23 | End: 2025-05-23

## 2025-05-23 RX ADMIN — KETOROLAC TROMETHAMINE 15 MG: 30 INJECTION, SOLUTION INTRAMUSCULAR; INTRAVENOUS at 10:47

## 2025-05-23 NOTE — ED PROVIDER NOTES
Subjective   History of Present Illness  79-year-old female with past medical history of hypertension, CAD, presents the emergency department with right-sided rib pain.  Patient states that she been having pain for a month.  She states that she saw her primary care physician and was advised to see a physical therapist, but has been unable to do so.  She denies any nausea or vomiting associate with her pain.  She also denies any chest pain, or radiating pain.  She has been taking Advil at home with little relief.  She was concerned and therefore came to the emergency department.        Review of Systems   All other systems reviewed and are negative.      Past Medical History:   Diagnosis Date    CAD (coronary artery disease)     Carotid artery stenosis     Hypertension     Intermittent claudication     Ischemic cardiomyopathy     PAD (peripheral artery disease)     Stenosis of iliac artery        Allergies   Allergen Reactions    Benazepril Cough    Morphine And Codeine     Penicillins Rash       Past Surgical History:   Procedure Laterality Date    BILATERAL OOPHORECTOMY      BUNIONECTOMY      CARDIAC CATHETERIZATION      CATARACT EXTRACTION      CORONARY ANGIOPLASTY  10/2013    2 stents placed    HERNIA REPAIR      HYSTERECTOMY         Family History   Problem Relation Age of Onset    Anuerysm Father        Social History     Socioeconomic History    Marital status:    Tobacco Use    Smoking status: Former     Current packs/day: 0.00     Average packs/day: 0.5 packs/day for 57.0 years (28.5 ttl pk-yrs)     Types: Cigarettes     Start date: 1966     Quit date: 2023     Years since quittin.0     Passive exposure: Past    Smokeless tobacco: Never   Vaping Use    Vaping status: Never Used   Substance and Sexual Activity    Alcohol use: No     Comment: Caffeine use: 2-4 cups daily    Drug use: Never     Comment: CAFFEINE USE    Sexual activity: Defer           Objective   Physical  Exam  Constitutional:       Appearance: Normal appearance.   HENT:      Head: Normocephalic.      Mouth/Throat:      Mouth: Mucous membranes are moist.   Eyes:      Pupils: Pupils are equal, round, and reactive to light.   Cardiovascular:      Rate and Rhythm: Normal rate and regular rhythm.   Pulmonary:      Effort: Pulmonary effort is normal.   Abdominal:      Palpations: Abdomen is soft.   Musculoskeletal:         General: Normal range of motion.      Cervical back: Normal range of motion.      Comments: Mild tenderness to palpation over ribs 6 and 7 on the right posterior aspect   Neurological:      General: No focal deficit present.      Mental Status: She is alert.   Psychiatric:         Mood and Affect: Mood normal.         Procedures           ED Course  ED Course as of 05/23/25 1158   Fri May 23, 2025   1106 EKG independently interpreted by me  Rate 63  VT interval 195  QTc 436  Sinus rhythm  Q waves in the anterior leads, old  No STEMI  Electronically signed by Hermes Mcginnis DO, 05/23/25, 11:07 AM EDT.   [OI]      ED Course User Index  [OI] Hermes Mcginnis DO                                                       Medical Decision Making  79-year-old female with past medical history of hypertension, CAD, presents the emergency department with right-sided rib pain.  Patient states that she been having pain for a month.  She states that she saw her primary care physician and was advised to see a physical therapist, but has been unable to do so.  She denies any nausea or vomiting associate with her pain.  She also denies any chest pain, or radiating pain.  She has been taking Advil at home with little relief.  She was concerned and therefore came to the emergency department.  Vital signs within normal limits upon arrival.  Differential diagnosis includes ACS, costochondritis, pneumonia, among others.  Chest x-ray independently interpreted by me shows no acute cardiopulmonary process.  EKG independently interpreted  by me shows no signs of acute ischemia.  History and physical consistent with costochondritis.  Patient given Toradol here in the emergency department, and a prescription for lidocaine patches for home use.  I advised her to follow-up once again with her primary care physician to have physical therapy referral outpatient.  Upon reexamination she is in no acute distress, nontoxic-appearing, in stable condition.  Patient understands and agrees with our plan for discharge.    Problems Addressed:  Rib pain on right side: complicated acute illness or injury    Amount and/or Complexity of Data Reviewed  Radiology: ordered and independent interpretation performed.  ECG/medicine tests: ordered. Decision-making details documented in ED Course.    Risk  Prescription drug management.        Final diagnoses:   Rib pain on right side       ED Disposition  ED Disposition       ED Disposition   Discharge    Condition   Stable    Comment   --               Ann Neal MD  [address] [id number] [phone]    In 2 days           Medication List        New Prescriptions      lidocaine 5 %  Commonly known as: LIDODERM  Place 1 patch on the skin as directed by provider Daily for 15 days. Remove & Discard patch within 12 hours or as directed by MD               Where to Get Your Medications        These medications were sent to [facility] [address] [place] [address] - [address] [facility] - [phone]  - [phone] FX  [address] [facility] [address]      Phone: [phone]   lidocaine 5 %            Hermes Mcginnis DO  05/23/25 5899

## 2025-06-06 ENCOUNTER — OFFICE VISIT (OUTPATIENT)
Dept: CARDIOLOGY | Facility: CLINIC | Age: 80
End: 2025-06-06
Payer: MEDICARE

## 2025-06-06 VITALS
SYSTOLIC BLOOD PRESSURE: 132 MMHG | HEART RATE: 59 BPM | BODY MASS INDEX: 18.56 KG/M2 | WEIGHT: 115.5 LBS | HEIGHT: 66 IN | OXYGEN SATURATION: 93 % | DIASTOLIC BLOOD PRESSURE: 75 MMHG

## 2025-06-06 DIAGNOSIS — I10 PRIMARY HYPERTENSION: ICD-10-CM

## 2025-06-06 DIAGNOSIS — Z72.0 TOBACCO USE: ICD-10-CM

## 2025-06-06 DIAGNOSIS — I25.810 CORONARY ARTERY DISEASE INVOLVING CORONARY BYPASS GRAFT OF NATIVE HEART WITHOUT ANGINA PECTORIS: Primary | ICD-10-CM

## 2025-06-06 PROCEDURE — 3078F DIAST BP <80 MM HG: CPT | Performed by: FAMILY MEDICINE

## 2025-06-06 PROCEDURE — 3075F SYST BP GE 130 - 139MM HG: CPT | Performed by: FAMILY MEDICINE

## 2025-06-06 PROCEDURE — 93000 ELECTROCARDIOGRAM COMPLETE: CPT | Performed by: FAMILY MEDICINE

## 2025-06-06 PROCEDURE — 99214 OFFICE O/P EST MOD 30 MIN: CPT | Performed by: FAMILY MEDICINE
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
## 2025-07-08 RX ORDER — CARVEDILOL 3.12 MG/1
3.12 TABLET ORAL 2 TIMES DAILY WITH MEALS
Qty: 180 TABLET | Refills: 0 | Status: SHIPPED | OUTPATIENT
Start: 2025-07-08

## 2025-07-11 ENCOUNTER — TELEPHONE (OUTPATIENT)
Dept: CARDIOLOGY | Age: 80
End: 2025-07-11
Payer: MEDICARE

## 2025-07-11 DIAGNOSIS — I25.810 CORONARY ARTERY DISEASE INVOLVING CORONARY BYPASS GRAFT OF NATIVE HEART WITHOUT ANGINA PECTORIS: ICD-10-CM

## 2025-07-11 DIAGNOSIS — Z72.0 TOBACCO USE: ICD-10-CM

## 2025-07-11 DIAGNOSIS — I25.5 CARDIOMYOPATHY, ISCHEMIC: ICD-10-CM

## 2025-07-11 DIAGNOSIS — I73.9 PAD (PERIPHERAL ARTERY DISEASE): ICD-10-CM

## 2025-07-11 RX ORDER — AMLODIPINE BESYLATE 5 MG/1
5 TABLET ORAL NIGHTLY
Qty: 90 TABLET | Refills: 3 | Status: SHIPPED | OUTPATIENT
Start: 2025-07-11

## 2025-07-11 NOTE — TELEPHONE ENCOUNTER
Hub staff attempted to follow warm transfer process and was unsuccessful     Caller: [phone]    Relationship to patient:     Best call back number: [phone]    Patient is needing: PATIENT WAS RETURNING A CALL AND HUB ATT EMPTED TO WARM TRANSFER TO TRIAGE BUT NO ANSWER. PLEASE CONTACT PATIENT. THANK YOU.

## 2025-07-11 NOTE — TELEPHONE ENCOUNTER
EE pt    Patient statesabout 5 days after amlodipine was increased to 10 mg pt experienced itching of the ears and red whelps all over her today.  Last Sunday she decided to cut the pill in half and take 5 mg daily, and she not has any other whelps.  Her ears itch a little still but better.    She has some chronic SOA at baseline, no swelling of LE.    She has not been monitoring her BP.    Should she just stay on the 5 mg daily?  If so she requests a new script for 5 mg tablet as the tablets are hard to cut to [facility].    Radha Almanza RN  [facility] Triage  07/11/25 13:20 EDT

## 2025-07-11 NOTE — TELEPHONE ENCOUNTER
Yes she should go back to the 5 mg I will refill this for her  I would like her to monitor blood pressure at home and call us with systolic consistently in the 140s or greater  Thank you

## 2025-07-11 NOTE — TELEPHONE ENCOUNTER
Called and left VM on home number (mobile not in service at this time), will continue to try to reach pt.    HUB- please put patient straight through to triage    Brenna Broussard RN  Triage RN  07/11/25 14:53 EDT

## 2025-07-14 NOTE — TELEPHONE ENCOUNTER
I called and spoke with pt to let her know of recommendations and she verbalizes understanding.    Radha Almanza RN  [facility] Triage  07/14/25 12:39 EDT'